# Patient Record
Sex: FEMALE | Race: WHITE | NOT HISPANIC OR LATINO | Employment: STUDENT | URBAN - METROPOLITAN AREA
[De-identification: names, ages, dates, MRNs, and addresses within clinical notes are randomized per-mention and may not be internally consistent; named-entity substitution may affect disease eponyms.]

---

## 2021-10-04 ENCOUNTER — APPOINTMENT (OUTPATIENT)
Dept: RADIOLOGY | Facility: MEDICAL CENTER | Age: 51
End: 2021-10-04
Attending: EMERGENCY MEDICINE
Payer: MEDICAID

## 2021-10-04 ENCOUNTER — HOSPITAL ENCOUNTER (EMERGENCY)
Facility: MEDICAL CENTER | Age: 51
End: 2021-10-04
Attending: EMERGENCY MEDICINE
Payer: MEDICAID

## 2021-10-04 VITALS
OXYGEN SATURATION: 96 % | BODY MASS INDEX: 36.88 KG/M2 | TEMPERATURE: 97.6 F | HEART RATE: 65 BPM | SYSTOLIC BLOOD PRESSURE: 164 MMHG | HEIGHT: 72 IN | DIASTOLIC BLOOD PRESSURE: 96 MMHG | RESPIRATION RATE: 14 BRPM | WEIGHT: 272.27 LBS

## 2021-10-04 DIAGNOSIS — R10.13 EPIGASTRIC PAIN: ICD-10-CM

## 2021-10-04 LAB
ALBUMIN SERPL BCP-MCNC: 4.9 G/DL (ref 3.2–4.9)
ALBUMIN/GLOB SERPL: 1.6 G/DL
ALP SERPL-CCNC: 88 U/L (ref 30–99)
ALT SERPL-CCNC: 35 U/L (ref 2–50)
ANION GAP SERPL CALC-SCNC: 10 MMOL/L (ref 7–16)
APPEARANCE UR: CLEAR
AST SERPL-CCNC: 27 U/L (ref 12–45)
BASOPHILS # BLD AUTO: 0.4 % (ref 0–1.8)
BASOPHILS # BLD: 0.04 K/UL (ref 0–0.12)
BILIRUB SERPL-MCNC: 0.4 MG/DL (ref 0.1–1.5)
BILIRUB UR QL STRIP.AUTO: NEGATIVE
BUN SERPL-MCNC: 8 MG/DL (ref 8–22)
CALCIUM SERPL-MCNC: 9.7 MG/DL (ref 8.5–10.5)
CHLORIDE SERPL-SCNC: 103 MMOL/L (ref 96–112)
CO2 SERPL-SCNC: 26 MMOL/L (ref 20–33)
COLOR UR: YELLOW
CREAT SERPL-MCNC: 0.76 MG/DL (ref 0.5–1.4)
EOSINOPHIL # BLD AUTO: 0.02 K/UL (ref 0–0.51)
EOSINOPHIL NFR BLD: 0.2 % (ref 0–6.9)
ERYTHROCYTE [DISTWIDTH] IN BLOOD BY AUTOMATED COUNT: 44 FL (ref 35.9–50)
GLOBULIN SER CALC-MCNC: 3.1 G/DL (ref 1.9–3.5)
GLUCOSE SERPL-MCNC: 134 MG/DL (ref 65–99)
GLUCOSE UR STRIP.AUTO-MCNC: NEGATIVE MG/DL
HCT VFR BLD AUTO: 42.7 % (ref 37–47)
HGB BLD-MCNC: 14.3 G/DL (ref 12–16)
IMM GRANULOCYTES # BLD AUTO: 0.03 K/UL (ref 0–0.11)
IMM GRANULOCYTES NFR BLD AUTO: 0.3 % (ref 0–0.9)
KETONES UR STRIP.AUTO-MCNC: NEGATIVE MG/DL
LEUKOCYTE ESTERASE UR QL STRIP.AUTO: NEGATIVE
LIPASE SERPL-CCNC: 23 U/L (ref 11–82)
LYMPHOCYTES # BLD AUTO: 1.05 K/UL (ref 1–4.8)
LYMPHOCYTES NFR BLD: 10.7 % (ref 22–41)
MCH RBC QN AUTO: 32.1 PG (ref 27–33)
MCHC RBC AUTO-ENTMCNC: 33.5 G/DL (ref 33.6–35)
MCV RBC AUTO: 95.7 FL (ref 81.4–97.8)
MICRO URNS: NORMAL
MONOCYTES # BLD AUTO: 0.32 K/UL (ref 0–0.85)
MONOCYTES NFR BLD AUTO: 3.2 % (ref 0–13.4)
NEUTROPHILS # BLD AUTO: 8.39 K/UL (ref 2–7.15)
NEUTROPHILS NFR BLD: 85.2 % (ref 44–72)
NITRITE UR QL STRIP.AUTO: NEGATIVE
NRBC # BLD AUTO: 0 K/UL
NRBC BLD-RTO: 0 /100 WBC
PH UR STRIP.AUTO: 7.5 [PH] (ref 5–8)
PLATELET # BLD AUTO: 373 K/UL (ref 164–446)
PMV BLD AUTO: 9 FL (ref 9–12.9)
POTASSIUM SERPL-SCNC: 4.5 MMOL/L (ref 3.6–5.5)
PROT SERPL-MCNC: 8 G/DL (ref 6–8.2)
PROT UR QL STRIP: NEGATIVE MG/DL
RBC # BLD AUTO: 4.46 M/UL (ref 4.2–5.4)
RBC UR QL AUTO: NEGATIVE
SODIUM SERPL-SCNC: 139 MMOL/L (ref 135–145)
SP GR UR STRIP.AUTO: 1.02
UROBILINOGEN UR STRIP.AUTO-MCNC: 0.2 MG/DL
WBC # BLD AUTO: 9.9 K/UL (ref 4.8–10.8)

## 2021-10-04 PROCEDURE — 85025 COMPLETE CBC W/AUTO DIFF WBC: CPT

## 2021-10-04 PROCEDURE — 700105 HCHG RX REV CODE 258: Performed by: EMERGENCY MEDICINE

## 2021-10-04 PROCEDURE — 83690 ASSAY OF LIPASE: CPT

## 2021-10-04 PROCEDURE — 80053 COMPREHEN METABOLIC PANEL: CPT

## 2021-10-04 PROCEDURE — A9270 NON-COVERED ITEM OR SERVICE: HCPCS | Performed by: EMERGENCY MEDICINE

## 2021-10-04 PROCEDURE — 74177 CT ABD & PELVIS W/CONTRAST: CPT

## 2021-10-04 PROCEDURE — 99285 EMERGENCY DEPT VISIT HI MDM: CPT

## 2021-10-04 PROCEDURE — 36415 COLL VENOUS BLD VENIPUNCTURE: CPT

## 2021-10-04 PROCEDURE — 700117 HCHG RX CONTRAST REV CODE 255: Performed by: EMERGENCY MEDICINE

## 2021-10-04 PROCEDURE — 81003 URINALYSIS AUTO W/O SCOPE: CPT

## 2021-10-04 PROCEDURE — 96374 THER/PROPH/DIAG INJ IV PUSH: CPT | Mod: XU

## 2021-10-04 PROCEDURE — 76705 ECHO EXAM OF ABDOMEN: CPT

## 2021-10-04 PROCEDURE — 700111 HCHG RX REV CODE 636 W/ 250 OVERRIDE (IP): Performed by: EMERGENCY MEDICINE

## 2021-10-04 PROCEDURE — 700102 HCHG RX REV CODE 250 W/ 637 OVERRIDE(OP): Performed by: EMERGENCY MEDICINE

## 2021-10-04 RX ORDER — ONDANSETRON 2 MG/ML
4 INJECTION INTRAMUSCULAR; INTRAVENOUS ONCE
Status: COMPLETED | OUTPATIENT
Start: 2021-10-04 | End: 2021-10-04

## 2021-10-04 RX ORDER — SODIUM CHLORIDE 9 MG/ML
1000 INJECTION, SOLUTION INTRAVENOUS ONCE
Status: COMPLETED | OUTPATIENT
Start: 2021-10-04 | End: 2021-10-04

## 2021-10-04 RX ADMIN — LIDOCAINE HYDROCHLORIDE 30 ML: 20 SOLUTION OROPHARYNGEAL at 11:06

## 2021-10-04 RX ADMIN — IOHEXOL 100 ML: 350 INJECTION, SOLUTION INTRAVENOUS at 12:00

## 2021-10-04 RX ADMIN — ONDANSETRON 4 MG: 2 INJECTION INTRAMUSCULAR; INTRAVENOUS at 08:20

## 2021-10-04 RX ADMIN — SODIUM CHLORIDE 1000 ML: 9 INJECTION, SOLUTION INTRAVENOUS at 08:20

## 2021-10-04 ASSESSMENT — ENCOUNTER SYMPTOMS
NAUSEA: 1
ABDOMINAL PAIN: 1
VOMITING: 1
DIARRHEA: 0
COUGH: 0
FEVER: 0
BLOOD IN STOOL: 0

## 2021-10-04 ASSESSMENT — LIFESTYLE VARIABLES: DO YOU DRINK ALCOHOL: NO

## 2021-10-04 NOTE — ED NOTES
Pt aware of plan of care.    Points to epigastric in relation to pain, radiates into back.     Urine sent.  PIV initiated, blood drawn and sent to lab.

## 2021-10-04 NOTE — ED TRIAGE NOTES
"Chief Complaint   Patient presents with   • Abdominal Pain     PT reports abd pain for the last 4 hours and reports vomited x 1 and still feeling terrible.     Blood Pressure (Abnormal) 163/89   Pulse (Abnormal) 58   Temperature 35.9 °C (96.6 °F) (Temporal)   Respiration 18   Height 1.854 m (6' 1\")   Weight 124 kg (272 lb 4.3 oz)   Oxygen Saturation 96% Comment: Room air  Body Mass Index 35.92 kg/m²     "

## 2021-10-04 NOTE — DISCHARGE INSTRUCTIONS
Return to the emergency department if your abdominal pain returns, if you have fever, vomiting, bloody or black stool or other concerns.  This may be coming from your gallbladder.  Follow-up with Dr. Ordoñez.  Continue your home antacid medications.  Rest, drink plenty of fluids and avoid fatty foods.

## 2021-10-04 NOTE — ED PROVIDER NOTES
ED Provider Note    Scribed for Jann Rosario M.D. by Juan Alberto Holder. 10/4/2021, 8:05 AM.    Primary care provider: Pcp Unobtainable By   Means of arrival: Walk-in  History obtained from: Patient  History limited by: None    CHIEF COMPLAINT  Chief Complaint   Patient presents with   • Abdominal Pain     PT reports abd pain for the last 4 hours and reports vomited x 1 and still feeling terrible.       SAMIR Eastman is a 51 y.o. female who presents to the Emergency Department for acute abdominal pain. Patient states her pain started around 10-11pm last night. The pain radiates to her back on both sides and is constant. There are no known alleviating or exacerbating factors. She has associated nausea and vomiting, denies any hematemesis, melena, dysuria, fevers, rhinorrhea, cough, or diarrhea. Patient reports having similar episodes of pain in the past which were treated with antiemetics. She also has a history of ulcers. She denies any abdominal surgeries.     REVIEW OF SYSTEMS  Review of Systems   Constitutional: Negative for fever.   Respiratory: Negative for cough.    Gastrointestinal: Positive for abdominal pain, nausea and vomiting. Negative for blood in stool, diarrhea and melena.   Genitourinary: Negative for dysuria.   All other systems reviewed and are negative.      PAST MEDICAL HISTORY     History of peptic ulcer disease.      SURGICAL HISTORY  patient denies any surgical history    SOCIAL HISTORY  Social History     Tobacco Use   • Smoking status: Not on file   Substance Use Topics   • Alcohol use: Not on file   • Drug use: Not on file      Social History     Substance and Sexual Activity   Drug Use Not on file       FAMILY HISTORY  No family history on file.    CURRENT MEDICATIONS  Home Medications    **Home medications have not yet been reviewed for this encounter**         ALLERGIES  Allergies   Allergen Reactions   • Compazine Hives     Hives         PHYSICAL EXAM  VITAL SIGNS: /91   " Pulse 76   Temp 36.5 °C (97.7 °F) (Temporal)   Resp 18   Ht 1.854 m (6' 1\")   Wt 124 kg (272 lb 4.3 oz)   SpO2 94%   BMI 35.92 kg/m²   Vitals reviewed.  Constitutional: Well developed, Well nourished, No acute distress, Non-toxic appearance.   HENT: Normocephalic, Atraumatic, Bilateral external ears normal,  Nose normal.   Eyes: PERRL, EOMI, Conjunctiva normal, No discharge.   Neck: Normal range of motion, No tenderness, Supple, No stridor.   Cardiovascular: Normal heart rate, Normal rhythm, No murmurs, No rubs, No gallops.   Thorax & Lungs: Normal breath sounds, No respiratory distress, No wheezing, No chest tenderness.   Abdomen: RUQ and epigastric tenderness to palpation, positive Coelho sign bowel sounds normal, Soft.  Skin: Warm, Dry, No erythema, No rash.   Back: No tenderness, No CVA tenderness.   Musculoskeletal: Good range of motion in all major joints.  Neurologic: Alert, No focal deficits noted.   Psychiatric: Affect normal    LABS  Results for orders placed or performed during the hospital encounter of 10/04/21   CBC WITH DIFFERENTIAL   Result Value Ref Range    WBC 9.9 4.8 - 10.8 K/uL    RBC 4.46 4.20 - 5.40 M/uL    Hemoglobin 14.3 12.0 - 16.0 g/dL    Hematocrit 42.7 37.0 - 47.0 %    MCV 95.7 81.4 - 97.8 fL    MCH 32.1 27.0 - 33.0 pg    MCHC 33.5 (L) 33.6 - 35.0 g/dL    RDW 44.0 35.9 - 50.0 fL    Platelet Count 373 164 - 446 K/uL    MPV 9.0 9.0 - 12.9 fL    Neutrophils-Polys 85.20 (H) 44.00 - 72.00 %    Lymphocytes 10.70 (L) 22.00 - 41.00 %    Monocytes 3.20 0.00 - 13.40 %    Eosinophils 0.20 0.00 - 6.90 %    Basophils 0.40 0.00 - 1.80 %    Immature Granulocytes 0.30 0.00 - 0.90 %    Nucleated RBC 0.00 /100 WBC    Neutrophils (Absolute) 8.39 (H) 2.00 - 7.15 K/uL    Lymphs (Absolute) 1.05 1.00 - 4.80 K/uL    Monos (Absolute) 0.32 0.00 - 0.85 K/uL    Eos (Absolute) 0.02 0.00 - 0.51 K/uL    Baso (Absolute) 0.04 0.00 - 0.12 K/uL    Immature Granulocytes (abs) 0.03 0.00 - 0.11 K/uL    NRBC (Absolute) " 0.00 K/uL   COMP METABOLIC PANEL   Result Value Ref Range    Sodium 139 135 - 145 mmol/L    Potassium 4.5 3.6 - 5.5 mmol/L    Chloride 103 96 - 112 mmol/L    Co2 26 20 - 33 mmol/L    Anion Gap 10.0 7.0 - 16.0    Glucose 134 (H) 65 - 99 mg/dL    Bun 8 8 - 22 mg/dL    Creatinine 0.76 0.50 - 1.40 mg/dL    Calcium 9.7 8.5 - 10.5 mg/dL    AST(SGOT) 27 12 - 45 U/L    ALT(SGPT) 35 2 - 50 U/L    Alkaline Phosphatase 88 30 - 99 U/L    Total Bilirubin 0.4 0.1 - 1.5 mg/dL    Albumin 4.9 3.2 - 4.9 g/dL    Total Protein 8.0 6.0 - 8.2 g/dL    Globulin 3.1 1.9 - 3.5 g/dL    A-G Ratio 1.6 g/dL   LIPASE   Result Value Ref Range    Lipase 23 11 - 82 U/L   URINALYSIS    Specimen: Urine   Result Value Ref Range    Color Yellow     Character Clear     Specific Gravity 1.021 <1.035    Ph 7.5 5.0 - 8.0    Glucose Negative Negative mg/dL    Ketones Negative Negative mg/dL    Protein Negative Negative mg/dL    Bilirubin Negative Negative    Urobilinogen, Urine 0.2 Negative    Nitrite Negative Negative    Leukocyte Esterase Negative Negative    Occult Blood Negative Negative    Micro Urine Req see below    ESTIMATED GFR   Result Value Ref Range    GFR If African American >60 >60 mL/min/1.73 m 2    GFR If Non African American >60 >60 mL/min/1.73 m 2     All labs reviewed by me.    RADIOLOGY  CT-ABDOMEN-PELVIS WITH   Final Result      1.  Findings concerning for acute cholecystitis.   2.  No bowel obstruction or perforation.   3.  Normal appendix.   4.  Probable uterine fibroid.      US-RUQ   Final Result      1.  Fatty infiltration appearance of the liver. Mild hepatomegaly.      2.  No cholelithiasis or biliary dilatation.      3.  Enlarged heterogeneous pancreas which is nonspecific. This could represent sequela of prior pancreatitis. No peripancreatic fluid is identified to suggest acute pancreatitis.      4.  No right hydronephrosis or free fluid.        The radiologist's interpretation of all radiological studies have been reviewed by  me.    COURSE & MEDICAL DECISION MAKING  Pertinent Labs & Imaging studies reviewed. (See chart for details)    Obtained and reviewed past medical records from 10/4/21 which indicated no pertinent records to review. .    8:05 AM Patient seen and examined at bedside. The patient presents with abdominal pain, and the differential diagnosis includes but is not limited to pancreatits ,peptic ulcer disease, symptomatic cholelithiasis, cholecystitis, gastroenteritis. Ordered for US-RUQ, CBC w/ diff, CMP, Lipase, and UA to evaluate. Patient will be treated with Zofran 4 mg for her symptoms.      10:55 AM Updated patient on results; she continues to have abdominal pain. Patient treated with GI Cocktail 30 mL. CT-abdomen-pelvis w/ ordered.    12:00 PM General Surgery paged.     12:05 PM I discussed the patient's case and the above findings with Dr. Ordoñez (Surgery) who reviewed the imaging; states patient can decide whether they be hospitalized or discharged with strict return precautions and outpatient follow up.  He does not feel that it is emergent require hospitalization ultrasound abnormality or lab abnormality.  He feels the patient can be discharged home and follow-up.  He would like me to auscultation.      Discussed with patient.  Exam is benign without any tenderness or peritonitis.  She still has a little bit discomfort but now she has a negative Coelho sign.  She does not want to be admitted.  She is on a PPI.  I think is unlikely since peptic ulcer disease, having black stool her labs are reassuring.    CT does not show any acute inflammatory change other than a questionable cholecystitis, however, we do not see this on ultrasound.    Abnormal labs reassuring repeat exam is reasonable to send her home.  She will return if she is more pain fever or vomiting.  She is referred to the surgeon as well as her doctor.  She understands she does have a low threshold for returning.  Her questions are answered, she is  agreeable to plan, she is discharged good condition.    12:14 PM Updated patient on results and plan of care. Discussed discharge instructions and return precautions with the patient and they were cleared for discharge. Patient was given the opportunity to ask any further questions. Patient is comfortable with discharge at this time.       HYDRATION: Based on the patient's presentation of Acute Vomiting the patient was given IV fluids. IV Hydration was used because oral hydration was not adequate alone. Upon recheck following hydration, the patient was improved.        The patient will return for new or worsening symptoms and is stable at the time of discharge.    The patient is referred to a primary physician for blood pressure management, diabetic screening, and for all other preventative health concerns.    DISPOSITION:  Patient will be discharged home in stable condition.    FOLLOW UP:  JOVAN Trotter  [address] [facility] [address]  [phone]          Jason Ordoñez M.D.  [address] [facility] [id number] [phone]    Schedule an appointment as soon as possible for a visit in 2 days        OUTPATIENT MEDICATIONS:  There are no discharge medications for this patient.      FINAL IMPRESSION  1. Epigastric pain          Juan Alberto DIXON (Scribe), am scribing for, and in the presence of, Jann Rosario M.D..    Electronically signed by: Juan Alberto Holder (Scribe), 10/4/2021    Jann DIXON M.D. personally performed the services described in this documentation, as scribed by Juan Alberto Holder in my presence, and it is both accurate and complete.    The note accurately reflects work and decisions made by me.  Jann Rosario M.D.  10/4/2021  2:09 PM

## 2021-12-09 ENCOUNTER — APPOINTMENT (OUTPATIENT)
Dept: RADIOLOGY | Facility: MEDICAL CENTER | Age: 51
DRG: 416 | End: 2021-12-09
Attending: EMERGENCY MEDICINE
Payer: MEDICAID

## 2021-12-09 ENCOUNTER — HOSPITAL ENCOUNTER (INPATIENT)
Facility: MEDICAL CENTER | Age: 51
LOS: 6 days | DRG: 416 | End: 2021-12-15
Attending: EMERGENCY MEDICINE | Admitting: SURGERY
Payer: MEDICAID

## 2021-12-09 DIAGNOSIS — K81.0 ACUTE CHOLECYSTITIS: ICD-10-CM

## 2021-12-09 LAB
ALBUMIN SERPL BCP-MCNC: 4.3 G/DL (ref 3.2–4.9)
ALBUMIN/GLOB SERPL: 1.3 G/DL
ALP SERPL-CCNC: 146 U/L (ref 30–99)
ALT SERPL-CCNC: 38 U/L (ref 2–50)
ANION GAP SERPL CALC-SCNC: 12 MMOL/L (ref 7–16)
APPEARANCE UR: CLEAR
AST SERPL-CCNC: 25 U/L (ref 12–45)
BASOPHILS # BLD AUTO: 0.8 % (ref 0–1.8)
BASOPHILS # BLD: 0.07 K/UL (ref 0–0.12)
BILIRUB SERPL-MCNC: 0.2 MG/DL (ref 0.1–1.5)
BILIRUB UR QL STRIP.AUTO: NEGATIVE
BUN SERPL-MCNC: 10 MG/DL (ref 8–22)
CALCIUM SERPL-MCNC: 9.3 MG/DL (ref 8.5–10.5)
CHLORIDE SERPL-SCNC: 104 MMOL/L (ref 96–112)
CO2 SERPL-SCNC: 22 MMOL/L (ref 20–33)
COLOR UR: YELLOW
CREAT SERPL-MCNC: 0.63 MG/DL (ref 0.5–1.4)
EOSINOPHIL # BLD AUTO: 0.19 K/UL (ref 0–0.51)
EOSINOPHIL NFR BLD: 2.2 % (ref 0–6.9)
ERYTHROCYTE [DISTWIDTH] IN BLOOD BY AUTOMATED COUNT: 43.1 FL (ref 35.9–50)
GLOBULIN SER CALC-MCNC: 3.3 G/DL (ref 1.9–3.5)
GLUCOSE SERPL-MCNC: 204 MG/DL (ref 65–99)
GLUCOSE UR STRIP.AUTO-MCNC: NEGATIVE MG/DL
HCG SERPL QL: NEGATIVE
HCT VFR BLD AUTO: 41.1 % (ref 37–47)
HGB BLD-MCNC: 13.7 G/DL (ref 12–16)
IMM GRANULOCYTES # BLD AUTO: 0.02 K/UL (ref 0–0.11)
IMM GRANULOCYTES NFR BLD AUTO: 0.2 % (ref 0–0.9)
KETONES UR STRIP.AUTO-MCNC: NEGATIVE MG/DL
LEUKOCYTE ESTERASE UR QL STRIP.AUTO: NEGATIVE
LIPASE SERPL-CCNC: 33 U/L (ref 11–82)
LYMPHOCYTES # BLD AUTO: 2.76 K/UL (ref 1–4.8)
LYMPHOCYTES NFR BLD: 32.3 % (ref 22–41)
MCH RBC QN AUTO: 30.8 PG (ref 27–33)
MCHC RBC AUTO-ENTMCNC: 33.3 G/DL (ref 33.6–35)
MCV RBC AUTO: 92.4 FL (ref 81.4–97.8)
MICRO URNS: NORMAL
MONOCYTES # BLD AUTO: 0.48 K/UL (ref 0–0.85)
MONOCYTES NFR BLD AUTO: 5.6 % (ref 0–13.4)
NEUTROPHILS # BLD AUTO: 5.03 K/UL (ref 2–7.15)
NEUTROPHILS NFR BLD: 58.9 % (ref 44–72)
NITRITE UR QL STRIP.AUTO: NEGATIVE
NRBC # BLD AUTO: 0 K/UL
NRBC BLD-RTO: 0 /100 WBC
PH UR STRIP.AUTO: 5 [PH] (ref 5–8)
PLATELET # BLD AUTO: 396 K/UL (ref 164–446)
PMV BLD AUTO: 9 FL (ref 9–12.9)
POTASSIUM SERPL-SCNC: 4 MMOL/L (ref 3.6–5.5)
PROT SERPL-MCNC: 7.6 G/DL (ref 6–8.2)
PROT UR QL STRIP: NEGATIVE MG/DL
RBC # BLD AUTO: 4.45 M/UL (ref 4.2–5.4)
RBC UR QL AUTO: NEGATIVE
SARS-COV+SARS-COV-2 AG RESP QL IA.RAPID: NOTDETECTED
SODIUM SERPL-SCNC: 138 MMOL/L (ref 135–145)
SP GR UR STRIP.AUTO: 1.01
SPECIMEN SOURCE: NORMAL
UROBILINOGEN UR STRIP.AUTO-MCNC: 0.2 MG/DL
WBC # BLD AUTO: 8.6 K/UL (ref 4.8–10.8)

## 2021-12-09 PROCEDURE — 85025 COMPLETE CBC W/AUTO DIFF WBC: CPT

## 2021-12-09 PROCEDURE — 700105 HCHG RX REV CODE 258: Performed by: EMERGENCY MEDICINE

## 2021-12-09 PROCEDURE — 87426 SARSCOV CORONAVIRUS AG IA: CPT

## 2021-12-09 PROCEDURE — 76705 ECHO EXAM OF ABDOMEN: CPT

## 2021-12-09 PROCEDURE — 84703 CHORIONIC GONADOTROPIN ASSAY: CPT

## 2021-12-09 PROCEDURE — 96365 THER/PROPH/DIAG IV INF INIT: CPT

## 2021-12-09 PROCEDURE — 700111 HCHG RX REV CODE 636 W/ 250 OVERRIDE (IP): Performed by: EMERGENCY MEDICINE

## 2021-12-09 PROCEDURE — 700105 HCHG RX REV CODE 258: Performed by: SURGERY

## 2021-12-09 PROCEDURE — 770001 HCHG ROOM/CARE - MED/SURG/GYN PRIV*

## 2021-12-09 PROCEDURE — 83690 ASSAY OF LIPASE: CPT

## 2021-12-09 PROCEDURE — 81003 URINALYSIS AUTO W/O SCOPE: CPT

## 2021-12-09 PROCEDURE — 99222 1ST HOSP IP/OBS MODERATE 55: CPT | Mod: 57 | Performed by: SURGERY

## 2021-12-09 PROCEDURE — 80053 COMPREHEN METABOLIC PANEL: CPT

## 2021-12-09 PROCEDURE — 99291 CRITICAL CARE FIRST HOUR: CPT

## 2021-12-09 RX ORDER — TRAZODONE HYDROCHLORIDE 100 MG/1
100 TABLET ORAL NIGHTLY
COMMUNITY

## 2021-12-09 RX ORDER — SODIUM CHLORIDE, SODIUM LACTATE, POTASSIUM CHLORIDE, CALCIUM CHLORIDE 600; 310; 30; 20 MG/100ML; MG/100ML; MG/100ML; MG/100ML
INJECTION, SOLUTION INTRAVENOUS CONTINUOUS
Status: DISCONTINUED | OUTPATIENT
Start: 2021-12-09 | End: 2021-12-11

## 2021-12-09 RX ORDER — ATORVASTATIN CALCIUM 20 MG/1
20 TABLET, FILM COATED ORAL DAILY
COMMUNITY

## 2021-12-09 RX ORDER — PANTOPRAZOLE SODIUM 40 MG/1
40 TABLET, DELAYED RELEASE ORAL DAILY
COMMUNITY

## 2021-12-09 RX ORDER — ARIPIPRAZOLE 5 MG/1
5 TABLET ORAL DAILY
COMMUNITY

## 2021-12-09 RX ORDER — LISINOPRIL 10 MG/1
10 TABLET ORAL DAILY
COMMUNITY

## 2021-12-09 RX ORDER — SERTRALINE HYDROCHLORIDE 100 MG/1
100 TABLET, FILM COATED ORAL DAILY
COMMUNITY

## 2021-12-09 RX ADMIN — PIPERACILLIN AND TAZOBACTAM 3.38 G: 3; .375 INJECTION, POWDER, LYOPHILIZED, FOR SOLUTION INTRAVENOUS; PARENTERAL at 20:21

## 2021-12-09 RX ADMIN — SODIUM CHLORIDE, POTASSIUM CHLORIDE, SODIUM LACTATE AND CALCIUM CHLORIDE: 600; 310; 30; 20 INJECTION, SOLUTION INTRAVENOUS at 20:22

## 2021-12-09 ASSESSMENT — FIBROSIS 4 INDEX: FIB4 SCORE: 0.65

## 2021-12-10 ENCOUNTER — ANESTHESIA EVENT (OUTPATIENT)
Dept: SURGERY | Facility: MEDICAL CENTER | Age: 51
DRG: 416 | End: 2021-12-10
Payer: MEDICAID

## 2021-12-10 ENCOUNTER — ANESTHESIA (OUTPATIENT)
Dept: SURGERY | Facility: MEDICAL CENTER | Age: 51
DRG: 416 | End: 2021-12-10
Payer: MEDICAID

## 2021-12-10 PROBLEM — E66.9 OBESITY (BMI 30.0-34.9): Status: ACTIVE | Noted: 2021-12-10

## 2021-12-10 LAB
GLUCOSE BLD-MCNC: 143 MG/DL (ref 65–99)
PATHOLOGY CONSULT NOTE: NORMAL

## 2021-12-10 PROCEDURE — 700102 HCHG RX REV CODE 250 W/ 637 OVERRIDE(OP): Performed by: SURGERY

## 2021-12-10 PROCEDURE — 501583 HCHG TROCAR, THRD CAN&SEAL 5X100: Performed by: SURGERY

## 2021-12-10 PROCEDURE — 700111 HCHG RX REV CODE 636 W/ 250 OVERRIDE (IP): Performed by: SURGERY

## 2021-12-10 PROCEDURE — 160009 HCHG ANES TIME/MIN: Performed by: SURGERY

## 2021-12-10 PROCEDURE — 160036 HCHG PACU - EA ADDL 30 MINS PHASE I: Performed by: SURGERY

## 2021-12-10 PROCEDURE — 501399 HCHG SPECIMAN BAG, ENDO CATC: Performed by: SURGERY

## 2021-12-10 PROCEDURE — 700102 HCHG RX REV CODE 250 W/ 637 OVERRIDE(OP): Performed by: ANESTHESIOLOGY

## 2021-12-10 PROCEDURE — A6402 STERILE GAUZE <= 16 SQ IN: HCPCS | Performed by: SURGERY

## 2021-12-10 PROCEDURE — 47600 CHOLECYSTECTOMY: CPT | Mod: 22 | Performed by: SURGERY

## 2021-12-10 PROCEDURE — 501572 HCHG TROCAR, SHIELD OBTU 5X100: Performed by: SURGERY

## 2021-12-10 PROCEDURE — 700101 HCHG RX REV CODE 250: Performed by: ANESTHESIOLOGY

## 2021-12-10 PROCEDURE — 770001 HCHG ROOM/CARE - MED/SURG/GYN PRIV*

## 2021-12-10 PROCEDURE — 64488 TAP BLOCK BI INJECTION: CPT | Performed by: SURGERY

## 2021-12-10 PROCEDURE — 160041 HCHG SURGERY MINUTES - EA ADDL 1 MIN LEVEL 4: Performed by: SURGERY

## 2021-12-10 PROCEDURE — 700101 HCHG RX REV CODE 250: Performed by: SURGERY

## 2021-12-10 PROCEDURE — 88304 TISSUE EXAM BY PATHOLOGIST: CPT | Mod: 59

## 2021-12-10 PROCEDURE — A9270 NON-COVERED ITEM OR SERVICE: HCPCS | Performed by: ANESTHESIOLOGY

## 2021-12-10 PROCEDURE — 502703 HCHG DEVICE, LIGASURE V SEALER: Performed by: SURGERY

## 2021-12-10 PROCEDURE — 700105 HCHG RX REV CODE 258: Performed by: SURGERY

## 2021-12-10 PROCEDURE — 160035 HCHG PACU - 1ST 60 MINS PHASE I: Performed by: SURGERY

## 2021-12-10 PROCEDURE — 700111 HCHG RX REV CODE 636 W/ 250 OVERRIDE (IP): Performed by: ANESTHESIOLOGY

## 2021-12-10 PROCEDURE — 82962 GLUCOSE BLOOD TEST: CPT

## 2021-12-10 PROCEDURE — 502571 HCHG PACK, LAP CHOLE: Performed by: SURGERY

## 2021-12-10 PROCEDURE — 700102 HCHG RX REV CODE 250 W/ 637 OVERRIDE(OP)

## 2021-12-10 PROCEDURE — 160002 HCHG RECOVERY MINUTES (STAT): Performed by: SURGERY

## 2021-12-10 PROCEDURE — 501582 HCHG TROCAR, THRD BLADED: Performed by: SURGERY

## 2021-12-10 PROCEDURE — 88331 PATH CONSLTJ SURG 1 BLK 1SPC: CPT

## 2021-12-10 PROCEDURE — 160029 HCHG SURGERY MINUTES - 1ST 30 MINS LEVEL 4: Performed by: SURGERY

## 2021-12-10 PROCEDURE — A9270 NON-COVERED ITEM OR SERVICE: HCPCS | Performed by: SURGERY

## 2021-12-10 PROCEDURE — A9270 NON-COVERED ITEM OR SERVICE: HCPCS

## 2021-12-10 PROCEDURE — 502240 HCHG MISC OR SUPPLY RC 0272: Performed by: SURGERY

## 2021-12-10 PROCEDURE — 160048 HCHG OR STATISTICAL LEVEL 1-5: Performed by: SURGERY

## 2021-12-10 PROCEDURE — 500373 HCHG DRAIN, J-P FLAT 10MM 7044: Performed by: SURGERY

## 2021-12-10 PROCEDURE — 501838 HCHG SUTURE GENERAL: Performed by: SURGERY

## 2021-12-10 PROCEDURE — 96366 THER/PROPH/DIAG IV INF ADDON: CPT

## 2021-12-10 RX ORDER — ATORVASTATIN CALCIUM 20 MG/1
20 TABLET, FILM COATED ORAL DAILY
Status: DISCONTINUED | OUTPATIENT
Start: 2021-12-10 | End: 2021-12-15 | Stop reason: HOSPADM

## 2021-12-10 RX ORDER — HALOPERIDOL 5 MG/ML
1 INJECTION INTRAMUSCULAR
Status: DISCONTINUED | OUTPATIENT
Start: 2021-12-10 | End: 2021-12-10 | Stop reason: HOSPADM

## 2021-12-10 RX ORDER — DEXAMETHASONE SODIUM PHOSPHATE 4 MG/ML
INJECTION, SOLUTION INTRA-ARTICULAR; INTRALESIONAL; INTRAMUSCULAR; INTRAVENOUS; SOFT TISSUE PRN
Status: DISCONTINUED | OUTPATIENT
Start: 2021-12-10 | End: 2021-12-10 | Stop reason: SURG

## 2021-12-10 RX ORDER — DOCUSATE SODIUM 100 MG/1
100 CAPSULE, LIQUID FILLED ORAL 2 TIMES DAILY
Status: DISCONTINUED | OUTPATIENT
Start: 2021-12-10 | End: 2021-12-15 | Stop reason: HOSPADM

## 2021-12-10 RX ORDER — LISINOPRIL 10 MG/1
10 TABLET ORAL DAILY
Status: DISCONTINUED | OUTPATIENT
Start: 2021-12-10 | End: 2021-12-15 | Stop reason: HOSPADM

## 2021-12-10 RX ORDER — AMOXICILLIN 250 MG
1 CAPSULE ORAL NIGHTLY
Status: DISCONTINUED | OUTPATIENT
Start: 2021-12-10 | End: 2021-12-15 | Stop reason: HOSPADM

## 2021-12-10 RX ORDER — ACETAMINOPHEN 325 MG/1
650 TABLET ORAL EVERY 6 HOURS PRN
Status: DISCONTINUED | OUTPATIENT
Start: 2021-12-15 | End: 2021-12-10

## 2021-12-10 RX ORDER — ONDANSETRON 2 MG/ML
4 INJECTION INTRAMUSCULAR; INTRAVENOUS
Status: COMPLETED | OUTPATIENT
Start: 2021-12-10 | End: 2021-12-10

## 2021-12-10 RX ORDER — LABETALOL HYDROCHLORIDE 5 MG/ML
5 INJECTION, SOLUTION INTRAVENOUS
Status: DISCONTINUED | OUTPATIENT
Start: 2021-12-10 | End: 2021-12-10 | Stop reason: HOSPADM

## 2021-12-10 RX ORDER — HYDROCODONE BITARTRATE AND ACETAMINOPHEN 5; 325 MG/1; MG/1
1-2 TABLET ORAL EVERY 4 HOURS PRN
Status: DISCONTINUED | OUTPATIENT
Start: 2021-12-10 | End: 2021-12-15 | Stop reason: HOSPADM

## 2021-12-10 RX ORDER — MEPERIDINE HYDROCHLORIDE 25 MG/ML
12.5 INJECTION INTRAMUSCULAR; INTRAVENOUS; SUBCUTANEOUS
Status: DISCONTINUED | OUTPATIENT
Start: 2021-12-10 | End: 2021-12-10 | Stop reason: HOSPADM

## 2021-12-10 RX ORDER — ROCURONIUM BROMIDE 10 MG/ML
INJECTION, SOLUTION INTRAVENOUS PRN
Status: DISCONTINUED | OUTPATIENT
Start: 2021-12-10 | End: 2021-12-10 | Stop reason: SURG

## 2021-12-10 RX ORDER — ARIPIPRAZOLE 5 MG/1
5 TABLET ORAL DAILY
Status: DISCONTINUED | OUTPATIENT
Start: 2021-12-10 | End: 2021-12-15 | Stop reason: HOSPADM

## 2021-12-10 RX ORDER — ACETAMINOPHEN 325 MG/1
650 TABLET ORAL EVERY 6 HOURS
Status: DISCONTINUED | OUTPATIENT
Start: 2021-12-10 | End: 2021-12-10

## 2021-12-10 RX ORDER — HYDROMORPHONE HYDROCHLORIDE 1 MG/ML
0.2 INJECTION, SOLUTION INTRAMUSCULAR; INTRAVENOUS; SUBCUTANEOUS
Status: DISCONTINUED | OUTPATIENT
Start: 2021-12-10 | End: 2021-12-10 | Stop reason: HOSPADM

## 2021-12-10 RX ORDER — ONDANSETRON 4 MG/1
4 TABLET, ORALLY DISINTEGRATING ORAL EVERY 4 HOURS PRN
Status: DISCONTINUED | OUTPATIENT
Start: 2021-12-10 | End: 2021-12-15 | Stop reason: HOSPADM

## 2021-12-10 RX ORDER — ONDANSETRON 2 MG/ML
4 INJECTION INTRAMUSCULAR; INTRAVENOUS EVERY 4 HOURS PRN
Status: DISCONTINUED | OUTPATIENT
Start: 2021-12-10 | End: 2021-12-15 | Stop reason: HOSPADM

## 2021-12-10 RX ORDER — SODIUM CHLORIDE, SODIUM LACTATE, POTASSIUM CHLORIDE, CALCIUM CHLORIDE 600; 310; 30; 20 MG/100ML; MG/100ML; MG/100ML; MG/100ML
INJECTION, SOLUTION INTRAVENOUS CONTINUOUS
Status: DISCONTINUED | OUTPATIENT
Start: 2021-12-10 | End: 2021-12-10 | Stop reason: HOSPADM

## 2021-12-10 RX ORDER — DIPHENHYDRAMINE HYDROCHLORIDE 50 MG/ML
12.5 INJECTION INTRAMUSCULAR; INTRAVENOUS
Status: DISCONTINUED | OUTPATIENT
Start: 2021-12-10 | End: 2021-12-10 | Stop reason: HOSPADM

## 2021-12-10 RX ORDER — ALBUTEROL SULFATE 90 UG/1
AEROSOL, METERED RESPIRATORY (INHALATION) PRN
Status: DISCONTINUED | OUTPATIENT
Start: 2021-12-10 | End: 2021-12-10 | Stop reason: SURG

## 2021-12-10 RX ORDER — BISACODYL 10 MG
10 SUPPOSITORY, RECTAL RECTAL
Status: DISCONTINUED | OUTPATIENT
Start: 2021-12-10 | End: 2021-12-15 | Stop reason: HOSPADM

## 2021-12-10 RX ORDER — BUPIVACAINE HYDROCHLORIDE 2.5 MG/ML
INJECTION, SOLUTION EPIDURAL; INFILTRATION; INTRACAUDAL
Status: COMPLETED | OUTPATIENT
Start: 2021-12-10 | End: 2021-12-10

## 2021-12-10 RX ORDER — OXYCODONE HYDROCHLORIDE 10 MG/1
10 TABLET ORAL
Status: DISCONTINUED | OUTPATIENT
Start: 2021-12-10 | End: 2021-12-11

## 2021-12-10 RX ORDER — HYDROMORPHONE HYDROCHLORIDE 1 MG/ML
0.5 INJECTION, SOLUTION INTRAMUSCULAR; INTRAVENOUS; SUBCUTANEOUS
Status: DISCONTINUED | OUTPATIENT
Start: 2021-12-10 | End: 2021-12-15

## 2021-12-10 RX ORDER — PANTOPRAZOLE SODIUM 40 MG/1
40 TABLET, DELAYED RELEASE ORAL DAILY
Status: DISCONTINUED | OUTPATIENT
Start: 2021-12-10 | End: 2021-12-10

## 2021-12-10 RX ORDER — ONDANSETRON 2 MG/ML
INJECTION INTRAMUSCULAR; INTRAVENOUS PRN
Status: DISCONTINUED | OUTPATIENT
Start: 2021-12-10 | End: 2021-12-10 | Stop reason: SURG

## 2021-12-10 RX ORDER — LIDOCAINE HYDROCHLORIDE 20 MG/ML
INJECTION, SOLUTION EPIDURAL; INFILTRATION; INTRACAUDAL; PERINEURAL PRN
Status: DISCONTINUED | OUTPATIENT
Start: 2021-12-10 | End: 2021-12-10 | Stop reason: SURG

## 2021-12-10 RX ORDER — TRAZODONE HYDROCHLORIDE 50 MG/1
100 TABLET ORAL NIGHTLY
Status: DISCONTINUED | OUTPATIENT
Start: 2021-12-10 | End: 2021-12-15 | Stop reason: HOSPADM

## 2021-12-10 RX ORDER — OMEPRAZOLE 20 MG/1
40 CAPSULE, DELAYED RELEASE ORAL DAILY
Status: DISCONTINUED | OUTPATIENT
Start: 2021-12-10 | End: 2021-12-15 | Stop reason: HOSPADM

## 2021-12-10 RX ORDER — CELECOXIB 200 MG/1
400 CAPSULE ORAL ONCE
Status: COMPLETED | OUTPATIENT
Start: 2021-12-10 | End: 2021-12-10

## 2021-12-10 RX ORDER — SERTRALINE HYDROCHLORIDE 100 MG/1
100 TABLET, FILM COATED ORAL DAILY
Status: DISCONTINUED | OUTPATIENT
Start: 2021-12-10 | End: 2021-12-15 | Stop reason: HOSPADM

## 2021-12-10 RX ORDER — MIDAZOLAM HYDROCHLORIDE 1 MG/ML
1 INJECTION INTRAMUSCULAR; INTRAVENOUS
Status: DISCONTINUED | OUTPATIENT
Start: 2021-12-10 | End: 2021-12-10 | Stop reason: HOSPADM

## 2021-12-10 RX ORDER — HYDROMORPHONE HYDROCHLORIDE 2 MG/ML
INJECTION, SOLUTION INTRAMUSCULAR; INTRAVENOUS; SUBCUTANEOUS PRN
Status: DISCONTINUED | OUTPATIENT
Start: 2021-12-10 | End: 2021-12-10 | Stop reason: SURG

## 2021-12-10 RX ORDER — ACETAMINOPHEN 500 MG
1000 TABLET ORAL EVERY 6 HOURS
Status: DISCONTINUED | OUTPATIENT
Start: 2021-12-10 | End: 2021-12-10

## 2021-12-10 RX ORDER — ACETAMINOPHEN 500 MG
1000 TABLET ORAL EVERY 6 HOURS PRN
Status: DISCONTINUED | OUTPATIENT
Start: 2021-12-15 | End: 2021-12-10

## 2021-12-10 RX ORDER — HYDROMORPHONE HYDROCHLORIDE 1 MG/ML
0.1 INJECTION, SOLUTION INTRAMUSCULAR; INTRAVENOUS; SUBCUTANEOUS
Status: DISCONTINUED | OUTPATIENT
Start: 2021-12-10 | End: 2021-12-10 | Stop reason: HOSPADM

## 2021-12-10 RX ORDER — SUCCINYLCHOLINE CHLORIDE 20 MG/ML
INJECTION INTRAMUSCULAR; INTRAVENOUS PRN
Status: DISCONTINUED | OUTPATIENT
Start: 2021-12-10 | End: 2021-12-10 | Stop reason: SURG

## 2021-12-10 RX ORDER — OXYCODONE HYDROCHLORIDE 5 MG/1
5 TABLET ORAL
Status: DISCONTINUED | OUTPATIENT
Start: 2021-12-10 | End: 2021-12-11

## 2021-12-10 RX ORDER — ENEMA 19; 7 G/133ML; G/133ML
1 ENEMA RECTAL
Status: DISCONTINUED | OUTPATIENT
Start: 2021-12-10 | End: 2021-12-15 | Stop reason: HOSPADM

## 2021-12-10 RX ORDER — TRAMADOL HYDROCHLORIDE 50 MG/1
50 TABLET ORAL EVERY 6 HOURS PRN
Status: DISCONTINUED | OUTPATIENT
Start: 2021-12-10 | End: 2021-12-14

## 2021-12-10 RX ORDER — HYDROMORPHONE HYDROCHLORIDE 1 MG/ML
0.4 INJECTION, SOLUTION INTRAMUSCULAR; INTRAVENOUS; SUBCUTANEOUS
Status: DISCONTINUED | OUTPATIENT
Start: 2021-12-10 | End: 2021-12-10 | Stop reason: HOSPADM

## 2021-12-10 RX ORDER — POLYETHYLENE GLYCOL 3350 17 G/17G
1 POWDER, FOR SOLUTION ORAL 2 TIMES DAILY
Status: DISCONTINUED | OUTPATIENT
Start: 2021-12-10 | End: 2021-12-15 | Stop reason: HOSPADM

## 2021-12-10 RX ORDER — CELECOXIB 200 MG/1
200 CAPSULE ORAL 2 TIMES DAILY
Status: DISCONTINUED | OUTPATIENT
Start: 2021-12-10 | End: 2021-12-15 | Stop reason: HOSPADM

## 2021-12-10 RX ORDER — BUPIVACAINE HYDROCHLORIDE AND EPINEPHRINE 5; 5 MG/ML; UG/ML
INJECTION, SOLUTION EPIDURAL; INTRACAUDAL; PERINEURAL
Status: DISCONTINUED | OUTPATIENT
Start: 2021-12-10 | End: 2021-12-10 | Stop reason: HOSPADM

## 2021-12-10 RX ORDER — AMOXICILLIN 250 MG
1 CAPSULE ORAL
Status: DISCONTINUED | OUTPATIENT
Start: 2021-12-10 | End: 2021-12-15 | Stop reason: HOSPADM

## 2021-12-10 RX ADMIN — SODIUM CHLORIDE, POTASSIUM CHLORIDE, SODIUM LACTATE AND CALCIUM CHLORIDE: 600; 310; 30; 20 INJECTION, SOLUTION INTRAVENOUS at 14:28

## 2021-12-10 RX ADMIN — ALBUTEROL SULFATE 4 PUFF: 90 AEROSOL, METERED RESPIRATORY (INHALATION) at 08:53

## 2021-12-10 RX ADMIN — SUGAMMADEX 200 MG: 100 INJECTION, SOLUTION INTRAVENOUS at 10:07

## 2021-12-10 RX ADMIN — SODIUM CHLORIDE, POTASSIUM CHLORIDE, SODIUM LACTATE AND CALCIUM CHLORIDE: 600; 310; 30; 20 INJECTION, SOLUTION INTRAVENOUS at 07:50

## 2021-12-10 RX ADMIN — FENTANYL CITRATE 25 MCG: 50 INJECTION INTRAMUSCULAR; INTRAVENOUS at 10:27

## 2021-12-10 RX ADMIN — SERTRALINE HYDROCHLORIDE 100 MG: 100 TABLET ORAL at 05:25

## 2021-12-10 RX ADMIN — ACETAMINOPHEN 1000 MG: 500 TABLET ORAL at 15:01

## 2021-12-10 RX ADMIN — ARIPIPRAZOLE 5 MG: 5 TABLET ORAL at 17:35

## 2021-12-10 RX ADMIN — HYDROMORPHONE HYDROCHLORIDE 0.3 MG: 2 INJECTION, SOLUTION INTRAMUSCULAR; INTRAVENOUS; SUBCUTANEOUS at 07:59

## 2021-12-10 RX ADMIN — HYDROMORPHONE HYDROCHLORIDE 0.4 MG: 1 INJECTION, SOLUTION INTRAMUSCULAR; INTRAVENOUS; SUBCUTANEOUS at 13:51

## 2021-12-10 RX ADMIN — PIPERACILLIN AND TAZOBACTAM 3.38 G: 3; .375 INJECTION, POWDER, LYOPHILIZED, FOR SOLUTION INTRAVENOUS; PARENTERAL at 05:25

## 2021-12-10 RX ADMIN — HYDROMORPHONE HYDROCHLORIDE 0.2 MG: 1 INJECTION, SOLUTION INTRAMUSCULAR; INTRAVENOUS; SUBCUTANEOUS at 11:19

## 2021-12-10 RX ADMIN — ONDANSETRON 4 MG: 2 INJECTION INTRAMUSCULAR; INTRAVENOUS at 07:55

## 2021-12-10 RX ADMIN — ROCURONIUM BROMIDE 5 MG: 10 INJECTION, SOLUTION INTRAVENOUS at 07:52

## 2021-12-10 RX ADMIN — ROCURONIUM BROMIDE 15 MG: 10 INJECTION, SOLUTION INTRAVENOUS at 09:25

## 2021-12-10 RX ADMIN — OMEPRAZOLE 40 MG: 20 CAPSULE, DELAYED RELEASE ORAL at 05:25

## 2021-12-10 RX ADMIN — HYDROMORPHONE HYDROCHLORIDE 0.4 MG: 2 INJECTION, SOLUTION INTRAMUSCULAR; INTRAVENOUS; SUBCUTANEOUS at 09:51

## 2021-12-10 RX ADMIN — ONDANSETRON 4 MG: 2 INJECTION INTRAMUSCULAR; INTRAVENOUS at 15:45

## 2021-12-10 RX ADMIN — DOCUSATE SODIUM 50 MG AND SENNOSIDES 8.6 MG 1 TABLET: 8.6; 5 TABLET, FILM COATED ORAL at 21:31

## 2021-12-10 RX ADMIN — TRAZODONE HYDROCHLORIDE 100 MG: 100 TABLET ORAL at 21:32

## 2021-12-10 RX ADMIN — FENTANYL CITRATE 50 MCG: 50 INJECTION INTRAMUSCULAR; INTRAVENOUS at 10:34

## 2021-12-10 RX ADMIN — TRAZODONE HYDROCHLORIDE 100 MG: 100 TABLET ORAL at 01:17

## 2021-12-10 RX ADMIN — DOCUSATE SODIUM 100 MG: 100 CAPSULE ORAL at 17:35

## 2021-12-10 RX ADMIN — ROCURONIUM BROMIDE 45 MG: 10 INJECTION, SOLUTION INTRAVENOUS at 07:57

## 2021-12-10 RX ADMIN — LIDOCAINE HYDROCHLORIDE 100 MG: 20 INJECTION, SOLUTION EPIDURAL; INFILTRATION; INTRACAUDAL at 07:55

## 2021-12-10 RX ADMIN — CELECOXIB 400 MG: 200 CAPSULE ORAL at 15:02

## 2021-12-10 RX ADMIN — SUCCINYLCHOLINE CHLORIDE 100 MG: 20 INJECTION, SOLUTION INTRAMUSCULAR; INTRAVENOUS; PARENTERAL at 07:55

## 2021-12-10 RX ADMIN — LISINOPRIL 10 MG: 10 TABLET ORAL at 05:25

## 2021-12-10 RX ADMIN — ONDANSETRON 4 MG: 2 INJECTION INTRAMUSCULAR; INTRAVENOUS at 13:48

## 2021-12-10 RX ADMIN — BUPIVACAINE HYDROCHLORIDE 60 ML: 2.5 INJECTION, SOLUTION EPIDURAL; INFILTRATION; INTRACAUDAL; PERINEURAL at 10:04

## 2021-12-10 RX ADMIN — ROCURONIUM BROMIDE 15 MG: 10 INJECTION, SOLUTION INTRAVENOUS at 08:47

## 2021-12-10 RX ADMIN — HYDROMORPHONE HYDROCHLORIDE 0.2 MG: 1 INJECTION, SOLUTION INTRAMUSCULAR; INTRAVENOUS; SUBCUTANEOUS at 11:28

## 2021-12-10 RX ADMIN — HYDROMORPHONE HYDROCHLORIDE 0.5 MG: 2 INJECTION, SOLUTION INTRAMUSCULAR; INTRAVENOUS; SUBCUTANEOUS at 09:04

## 2021-12-10 RX ADMIN — PIPERACILLIN AND TAZOBACTAM 3.38 G: 3; .375 INJECTION, POWDER, LYOPHILIZED, FOR SOLUTION INTRAVENOUS; PARENTERAL at 00:01

## 2021-12-10 RX ADMIN — HYDROCODONE BITARTRATE AND ACETAMINOPHEN 15 MG: 7.5; 325 SOLUTION ORAL at 10:32

## 2021-12-10 RX ADMIN — FENTANYL CITRATE 25 MCG: 50 INJECTION INTRAMUSCULAR; INTRAVENOUS at 10:47

## 2021-12-10 RX ADMIN — HYDROCODONE BITARTRATE AND ACETAMINOPHEN 1 TABLET: 5; 325 TABLET ORAL at 21:32

## 2021-12-10 RX ADMIN — PIPERACILLIN AND TAZOBACTAM 3.38 G: 3; .375 INJECTION, POWDER, LYOPHILIZED, FOR SOLUTION INTRAVENOUS; PARENTERAL at 21:32

## 2021-12-10 RX ADMIN — PROPOFOL 140 MG: 10 INJECTION, EMULSION INTRAVENOUS at 07:55

## 2021-12-10 RX ADMIN — ACETAMINOPHEN 650 MG: 325 TABLET, FILM COATED ORAL at 05:24

## 2021-12-10 RX ADMIN — DEXAMETHASONE SODIUM PHOSPHATE 4 MG: 4 INJECTION, SOLUTION INTRA-ARTICULAR; INTRALESIONAL; INTRAMUSCULAR; INTRAVENOUS; SOFT TISSUE at 07:55

## 2021-12-10 RX ADMIN — ATORVASTATIN CALCIUM 20 MG: 20 TABLET, FILM COATED ORAL at 05:25

## 2021-12-10 RX ADMIN — PIPERACILLIN AND TAZOBACTAM 3.38 G: 3; .375 INJECTION, POWDER, LYOPHILIZED, FOR SOLUTION INTRAVENOUS; PARENTERAL at 15:02

## 2021-12-10 ASSESSMENT — LIFESTYLE VARIABLES
CONSUMPTION TOTAL: NEGATIVE
TOTAL SCORE: 0
TOTAL SCORE: 0
EVER_SMOKED: NEVER
DOES PATIENT WANT TO STOP DRINKING: NO
EVER HAD A DRINK FIRST THING IN THE MORNING TO STEADY YOUR NERVES TO GET RID OF A HANGOVER: NO
AVERAGE NUMBER OF DAYS PER WEEK YOU HAVE A DRINK CONTAINING ALCOHOL: 0
EVER FELT BAD OR GUILTY ABOUT YOUR DRINKING: NO
ON A TYPICAL DAY WHEN YOU DRINK ALCOHOL HOW MANY DRINKS DO YOU HAVE: 0
HOW MANY TIMES IN THE PAST YEAR HAVE YOU HAD 5 OR MORE DRINKS IN A DAY: 0
ALCOHOL_USE: NO
TOTAL SCORE: 0
HAVE YOU EVER FELT YOU SHOULD CUT DOWN ON YOUR DRINKING: NO
HAVE PEOPLE ANNOYED YOU BY CRITICIZING YOUR DRINKING: NO

## 2021-12-10 ASSESSMENT — COGNITIVE AND FUNCTIONAL STATUS - GENERAL
DRESSING REGULAR LOWER BODY CLOTHING: A LITTLE
CLIMB 3 TO 5 STEPS WITH RAILING: A LITTLE
DRESSING REGULAR UPPER BODY CLOTHING: A LITTLE
STANDING UP FROM CHAIR USING ARMS: A LITTLE
HELP NEEDED FOR BATHING: A LITTLE
SUGGESTED CMS G CODE MODIFIER DAILY ACTIVITY: CJ
DAILY ACTIVITIY SCORE: 21
SUGGESTED CMS G CODE MODIFIER MOBILITY: CJ
MOVING TO AND FROM BED TO CHAIR: A LITTLE
MOBILITY SCORE: 21

## 2021-12-10 ASSESSMENT — PAIN DESCRIPTION - PAIN TYPE
TYPE: SURGICAL PAIN

## 2021-12-10 ASSESSMENT — COPD QUESTIONNAIRES
COPD SCREENING SCORE: 2
HAVE YOU SMOKED AT LEAST 100 CIGARETTES IN YOUR ENTIRE LIFE: NO/DON'T KNOW
DURING THE PAST 4 WEEKS HOW MUCH DID YOU FEEL SHORT OF BREATH: SOME OF THE TIME
DO YOU EVER COUGH UP ANY MUCUS OR PHLEGM?: NO/ONLY WITH OCCASIONAL COLDS OR INFECTIONS

## 2021-12-10 ASSESSMENT — PATIENT HEALTH QUESTIONNAIRE - PHQ9
2. FEELING DOWN, DEPRESSED, IRRITABLE, OR HOPELESS: NOT AT ALL
SUM OF ALL RESPONSES TO PHQ9 QUESTIONS 1 AND 2: 0
1. LITTLE INTEREST OR PLEASURE IN DOING THINGS: NOT AT ALL

## 2021-12-10 ASSESSMENT — PAIN SCALES - GENERAL: PAIN_LEVEL: 6

## 2021-12-10 NOTE — H&P
"      CHIEF COMPLAINT: Right upper quadrant abdominal pain    HISTORY OF PRESENT ILLNESS: 51-year-old female with history of right upper quadrant pain that has become increasingly worse over the last few weeks.  Patient presents today for evaluation due to the pain.  She denies significant changes in her appetite but she has intentionally lost 50pounds.  Patient used to be on insulin but is no longer taking that.  Patient states pain is worse after eating and she was in the emergency room back in October on was recommended to a surgeon for cholecystectomy but did not follow-up.  She denies fevers chills, nausea or vomiting.  She has no other complaints    PAST MEDICAL HISTORY: History of upper GI bleed due to ulcer in 2017.  She underwent endoscopy for this.  She is on proton pump inhibitors.  She also has depression, diet-controlled diabetes, hypertension and hyperlipidemia    PAST SURGICAL HISTORY: Tubal ligation    ALLERGIES:   Allergies   Allergen Reactions   • Compazine Hives and Unspecified     Hives  \"Feeling like wanting to crawl out of her skin\" per pt       CURRENT MEDICATIONS:   Home Medications     Reviewed by Hi Haddad (Pharmacy Tech) on 12/09/21 at 1930  Med List Status: Complete   Medication Last Dose Status   ARIPiprazole (ABILIFY) 5 MG tablet 12/8/2021 Active   atorvastatin (LIPITOR) 20 MG Tab 12/9/2021 Active   Chlorpheniramine Maleate (ALLERGY PO) 12/9/2021 Active   Cholecalciferol 2000 UNIT Cap 12/9/2021 Active   lisinopril (PRINIVIL) 10 MG Tab 12/9/2021 Active   pantoprazole (PROTONIX) 40 MG Tablet Delayed Response 12/9/2021 Active   sertraline (ZOLOFT) 100 MG Tab 12/9/2021 Active   traZODone (DESYREL) 100 MG Tab 12/8/2021 Active                FAMILY HISTORY: family history is not on file.    SOCIAL HISTORY:  reports that she has never smoked. She has never used smokeless tobacco. She reports that she does not drink alcohol and does not use drugs.    REVIEW OF SYSTEMS: Review of " "systems is remarkable for the following Postprandial right upper quadrant abdominal pain. The remainder of the comprehensive ROS is negative with the exception of the aforementioned HPI, PMH, and PSH bullets in accordance with CMS guideline.    PHYSICAL EXAMINATION:      Constitutional:     Vital Signs: BP (!) 175/112   Pulse (!) 103   Temp 37.2 °C (98.9 °F) (Temporal)   Resp 19   Ht 1.854 m (6' 1\")   Wt 118 kg (261 lb 0.4 oz)   SpO2 97%    General Appearance: The patient is a healthy-appearing female who looks her stated age.  HEENT:    No jaundice or icterus.  Mucous membranes moist.  Neck:    Supple with midline trachea  Respiratory:   Inspection: Unlabored respirations, no intercostal retractions, paradoxical motion, or accessory muscle use.   Auscultation: clear to auscultation.  Cardiovascular:   Inspection: The skin is warm and well purfused.  Auscultation: Regular rate and rhythm.   Peripheral Pulses: Normal.   Abdomen:   Palpation: Palpation is remarkable for right upper quadrant pain with rebound.  Otherwise soft and nontender  Musculoskeletal:   No cyanosis edema or deformity  Skin:    Examination of the skin reveals no jaundice.  Neurologic:   Neurologic examination reveals no focal deficits noted.    LABORATORY VALUES:   Recent Labs     12/09/21  1759   WBC 8.6   RBC 4.45   HEMOGLOBIN 13.7   HEMATOCRIT 41.1   MCV 92.4   MCH 30.8   MCHC 33.3*   RDW 43.1   PLATELETCT 396   MPV 9.0     Recent Labs     12/09/21  1759   SODIUM 138   POTASSIUM 4.0   CHLORIDE 104   CO2 22   GLUCOSE 204*   BUN 10   CREATININE 0.63   CALCIUM 9.3     Recent Labs     12/09/21  1759   ASTSGOT 25   ALTSGPT 38   TBILIRUBIN 0.2   ALKPHOSPHAT 146*   GLOBULIN 3.3            IMAGING:   US-RUQ   Final Result      1.  Marked gallbladder wall thickening and reportedly tenderness over the gallbladder without evidence of gallstone suggesting acalculous cholecystitis.      2.  Echogenic liver suggesting hepatic steatosis.    "       ASSESSMENT AND PLAN:   51-year-old female with right upper quadrant abdominal pain and ultrasound findings consistent with cholecystitis.  Admit to the surgical service.  N.p.o., IV antibiotics and IV fluids.  Lovenox ordered.  Home med reconciliation completed.  I described the procedure of laparoscopic cholecystectomy including its attendant risks which include but are not limited to: Wound infection, incisional hernia, organ space infection, bleeding, bile duct injury, bile leak and conversion open procedure.  Patient understands these risks and wishes to proceed with the procedure.  She has been added to the morning line at 730.  Dr. Valiente will see the patient in the morning prior to surgery.         ____________________________________     Sony Castillo D.O.    DD: 12/9/2021  7:54 PM

## 2021-12-10 NOTE — ANESTHESIA PROCEDURE NOTES
Peripheral Block    Date/Time: 12/10/2021 10:04 AM  Performed by: Jasbir Omer M.D.  Authorized by: Jasbir Omer M.D.     Patient Location:  OR  Start Time:  12/10/2021 10:04 AM  End Time:  12/10/2021 10:08 AM  Reason for Block: at surgeon's request and post-op pain management ONLY    patient identified, IV checked, site marked, risks and benefits discussed, surgical consent, monitors and equipment checked, pre-op evaluation and timeout performed    Patient Position:  Supine  Prep: ChloraPrep    Monitoring:  Continuous pulse ox, cardiac monitor and heart rate  Block Region:  Trunk  Trunk - Block Type:  Abdominal plane block - TAP block    Laterality:  Bilateral  Procedures: ultrasound guided  Image captured, interpreted and electronically stored.  Block Type:  Single-shot  Needle Length:  150mm  Needle Gauge:  20 G  Needle Localization:  Ultrasound guidance

## 2021-12-10 NOTE — OR NURSING
1016 Patient arrived to PACU from OR.  Report from anesthesia and RN.  Patient with oral airway in place.  Incision to abdomen is clean, dry and soft. JORGE LUIS drain in place.  1040 Patient medicated per MAR for pain. Sister updated.  1130 Patient reports an improvement in pain.  1230 Patient resting in [facility], tolerating oral intake.  1335 Report to Dana GRIGSBY.  1352 Patient ambulated to bathroom, tolerated well.  Voided.  Back to [facility].  Medicated for pain.  1410 Patient reports an improvement in pain.  1416 Patient transferred to [facility] via [facility] by CNA.  Belongings sent with patient.

## 2021-12-10 NOTE — ANESTHESIA TIME REPORT
Anesthesia Start and Stop Event Times     Date Time Event    12/10/2021 0731 Ready for Procedure     0750 Anesthesia Start     1024 Anesthesia Stop        Responsible Staff  12/10/21    Name Role Begin End    Jasbir Omer M.D. Anesth 0750 1024        Preop Diagnosis (Free Text):  Pre-op Diagnosis     Acute Cholecystitis        Preop Diagnosis (Codes):    Premium Reason  G. Contracted, Vacation    Comments:

## 2021-12-10 NOTE — PROGRESS NOTES
PREOPERATIVE NOTE: I have seen and examined the patient today.  Critical physical examination findings, laboratory indices, and radiographic studies reviewed.  I recommend  laparoscopic cholecystectomy / possible open cholecystectomy.    The operative strategy was explained and reviewed. Alternatives discussed. Potential complications including, but not limited to, infection, bleeding, damage to adjacent structures, and anesthetic complications were discussed. Operative consent signed.  Admission SARS-CoV-2 testing negative. LOW RISK patient. Repeat SARS-CoV-2 testing not indicated. Isolation precautions de-escalated.         ____________________________________     Ernie Valiente M.D.    DD: 12/10/2021  7:46 AM

## 2021-12-10 NOTE — PROGRESS NOTES
SBAR report provided to pre-op RN Dorie Moralez. Patient scheduled for transport at or around 0700.

## 2021-12-10 NOTE — ASSESSMENT & PLAN NOTE
RUQ pain  U/S shows: Marked gallbladder wall thickening and reportedly tenderness over the gallbladder without evidence of gallstone suggesting acalculous cholecystitis.   Echogenic liver suggesting hepatic steatosis.  12/10 to OR for lap khanh converted to open khanh

## 2021-12-10 NOTE — OR SURGEON
Immediate Post OP Note    PreOp Diagnosis: Acute cholecystitis - acalculous    PostOp Diagnosis: same    Procedure(s):  ATTEMPTED LAPAROSCOPIC CONVERTED TO OPEN CHOLECYSTECTOMY - Wound Class: Contaminated    Surgeon(s):  Ernie Valiente M.D.    Assistant Surgeon:   Jillian Arauz M.D.    Anesthesiologist/Type of Anesthesia: GETA / local28 cc 0.5% maracaine with epiFriedkaren Arauz M.D.  Anesthesiologist: Jasbir Omer M.D./General    Surgical Staff:  Circulator: Guerline Valdivia R.N.  Relief Circulator: Alison Wilson R.N.  Scrub Person: Rohit Burgos  Count [place]: Crystal Crockett R.N.    Specimens removed if any:  ID Type Source Tests Collected by Time Destination   A : Gallbladder Tissue Gallbladder PATHOLOGY SPECIMEN Ernie Valiente M.D. 12/10/2021  9:04 AM    B : Gallbladder Tissue Gallbladder PATHOLOGY SPECIMEN Ernie Valiente M.D. 12/10/2021  9:43 AM        Estimated Blood Loss: 100 cc    Findings: marked inflammation, thickened gallbladder wall with dense omental adhesions and duodenum stuck medially and laterally.  Specimen to pathology for frozen to evaluate for possible cancer    Complications: conversion to open cholecystectomy    Drain - 10 mm flat drain - subhepatic      12/10/2021 10:09 AM Ernie Valiente M.D.

## 2021-12-10 NOTE — ED NOTES
Nursing Home Visit    NAME: Concepcion Nur  AGE: 78 y o  SEX: male [id number]    DATE OF ENCOUNTER: 12/18/2019    Nursing home/assisted living name: [facility]  Patient room number: 197  Code status: Full code   Patient has been legally declared an incapacitated person and a Elverson Haydee of the [facility] has been appointed Leila Martínez    Assessment and Plan     Problem List Items Addressed This Visit        Cardiovascular and Mediastinum    Essential hypertension     -/91, at goal today (BP goal <150/90)  -Reviewed BP log, has been at goal overall  -Continue HCTZ 12 5 mg QD, well-tolerated by patient  -Cr 0 86, K 3 5 on 10/31/19 labs  -Previous hypokalemia resolved but remains at lower limit of normal; likely 2/2 thiazide usage  -Continue potassium supplementation 20 mEq QD  -Recommend BMP Q6 months (repeat May 2020)            Nervous and Auditory    Dementia ([facility] [address] ) - Primary     -Patient stable from previous visit, remains pleasantly confused with stable mood, denies depression or anxiety symptoms  -AOx1 on exam today with poor recall; no behavioral disturbances  -Continue donepezil 10 mg QD  -Donepezil well-tolerated, with no bradycardia per review of VS log  -Continue ongoing monitoring for bradycardia at all visits  -Previous B12 deficiency resolved (B12 142 4/2019, improved to 776 10/31/19); continue B12 replacement via B12 1000 mcg QD  -Consider repeat B12 level in 6 months from previous (May 2020)            Musculoskeletal and Integument    Osteoarthritis     -Asymptomatic of OA of knees, but reports neck pain as noted below  -Continue tylenol 325 mg Q4H PRN for pain (TDD<3g/day)            Genitourinary    OAB (overactive bladder)     -Documented hx of OAB with urinary incontinence  -Not currently on any medications for OAB  -Patient denies any urinary symptoms today  -Continue current management via standard UI Report to CALISTA Atkins.    supplies            Other    Iron deficiency anemia     -Patient with previous normocytic, normochromic anemia in April 2019, prompting initiation of ferrous sulfate at last visit 10/31/19; well-tolerated, denies constipation  -Repeat labs 10/31/19 showed Hb 14 7, ferritin 29  -No evidence of ongoing losses, renal function WNL  -Plan to repeat CBC in February 2020 (at next visit)         Pre-diabetes     -Last HbA1C 5 9% on 4/30/19  -Glucose 92 on 10/31/19 labs  -May consider repeating HbA1C with next labs May 2020  -Encouraged to limit dietary carb intake          Cervicalgia     -Documented hx of cervicalgia, patient reporting mild neck pain, intermittent, mild hypertonicity on exam but non-tender   -Suspect patient forgets he has option to ask for tylenol  -Encouraged patient to report neck pain to nursing staff so they may give him his PRN tylenol dosing; pain not severe enough to warrant scheduled tylenol at this time  -Continue tylenol 325 mg Q4H PRN for pain (TDD<3g/day)  -Will add voltaren gel 4x/day PRN for neck pain                 Chief Complaint     Follow up nursing home visit    History of Present Illness     HPI    Patient seen and examined in his room today watching [employer]  No complaints other than some mild neck pain that bothers him at night; he has tylenol ordered PRN but believes he has not had any  Otherwise, he says he is "in good spirits", eating and drinking well, doesn't like to socialize very much with the other residents but says "I get around when I want to " No issues since starting iron supplements at last visit  Had bloodwork 10/31/19 that showed Hb 14 7 but ferritin 29, B12 776, glucose 92, Cr 0 86, K 3 5, otherwise WNL  Tolerating ferrous sulfate well, denies constipation  No other issues or complaints today  No behavioral disturbances per staff        The following portions of the patient's history were reviewed and updated as appropriate: allergies, current medications, past family history, past medical history, past social history, past surgical history and problem list     Review of Systems     Review of Systems   Constitutional: Negative for chills, fatigue and fever  Eyes: Negative for visual disturbance  Respiratory: Negative for chest tightness and shortness of breath  Cardiovascular: Negative for chest pain, palpitations and leg swelling  Gastrointestinal: Negative for abdominal pain, constipation, diarrhea, nausea and vomiting  Genitourinary: Negative for dysuria  OAB symptoms stable, denies any issues   Musculoskeletal: Positive for neck pain  Negative for arthralgias and gait problem  Reports overgrown toenails resolved, currently "neat and trim"   Skin: Negative for rash  Neurological: Negative for syncope, weakness and light-headedness  Psychiatric/Behavioral: Positive for confusion  Negative for agitation, behavioral problems, dysphoric mood, hallucinations, self-injury, sleep disturbance and suicidal ideas  The patient is not nervous/anxious  All other systems reviewed and are negative  Active Problem List     Patient Active Problem List   Diagnosis    Dementia ([facility] [address] )    History of gastroesophageal reflux (GERD)    Left ventricular hypertrophy by electrocardiogram    Iron deficiency anemia    B12 deficiency    Hypokalemia    Essential hypertension    Overgrown toenails    Osteoarthritis    Pre-diabetes    OAB (overactive bladder)         Current Medications   Medications reviewed and updated in facility chart        Current Outpatient Medications:     acetaminophen (TYLENOL) 325 mg tablet, Take 2 tablets (650 mg total) by mouth every 6 (six) hours as needed for fever, Disp: 30 tablet, Rfl: 0    cyanocobalamin 1000 MCG tablet, Take 1 tablet (1,000 mcg total) by mouth daily, Disp: 30 tablet, Rfl: 0    donepezil (ARICEPT) 10 mg tablet, Take 10 mg by mouth daily at bedtime, Disp: , Rfl:     hydrochlorothiazide (HYDRODIURIL) 12 5 mg tablet, Take 1 tablet (12 5 mg total) by mouth daily, Disp: 30 tablet, Rfl: 0    potassium chloride (K-DUR,KLOR-CON) 20 mEq tablet, Take 1 tablet (20 mEq total) by mouth 2 (two) times a day, Disp: 60 tablet, Rfl: 0     Objective     /91   Pulse 74   Wt 99 5 kg (219 lb 6 4 oz)   BMI 33 36 kg/m²     Physical Exam   Constitutional: He appears well-developed and well-nourished  No distress  HENT:   Head: Normocephalic and atraumatic  Eyes: Conjunctivae are normal  Right eye exhibits no discharge  Left eye exhibits no discharge  Neck: Normal range of motion  Neck supple  Mild hypertonicity of posterior cervical paravertebral musculature without tenderness to palpation   Cardiovascular: Normal rate, regular rhythm and intact distal pulses  Pulmonary/Chest: Effort normal and breath sounds normal  No respiratory distress  Abdominal: Soft  Bowel sounds are normal  There is no tenderness  Musculoskeletal: Normal range of motion  He exhibits no edema  Neurological: He is alert  Oriented to self only   Skin: Skin is warm and dry  Capillary refill takes less than 2 seconds  No rash noted  Psychiatric: He has a normal mood and affect  His behavior is normal    Vitals reviewed        Pertinent Laboratory/Diagnostic Studies from 10/31/19:  CBC: Hb 14 7, ferritin 29  B12: 776  BMP: glucose 92, Cr 0 86, K 3 5

## 2021-12-10 NOTE — ED TRIAGE NOTES
Chief Complaint   Patient presents with   • Abdominal Pain     intermittent, gallbladder pain ruq     Pt seen in October and advised to have gallbladder removed. Reports intermittent increased pain.

## 2021-12-10 NOTE — ED NOTES
Breaking primary RN:     Dr. Castillo at bedside. Pt to be admitted and have surgery in the morning.

## 2021-12-10 NOTE — CARE PLAN
The patient is Stable - Low risk of patient condition declining or worsening    Shift Goals  Clinical Goals: Tolerate diet, ambulate  Patient Goals: Pain control    Progress made toward(s) clinical / shift goals:    Problem: Pain - Standard  Goal: Alleviation of pain or a reduction in pain to the patient’s comfort goal  Outcome: Progressing     Problem: Knowledge Deficit - Standard  Goal: Patient and family/care givers will demonstrate understanding of plan of care, disease process/condition, diagnostic tests and medications  Outcome: Progressing       Patient is not progressing towards the following goals:

## 2021-12-10 NOTE — PROGRESS NOTES
Received bedside report from CALISTA Sharma, pt care assumed. VS WDL, pt assessment complete. Pt A&Ox4, no c/o pain at this time. POC discussed with pt and verbalizes no questions. Pt denies any additional needs at this time. Bed locked and in lowest position. Pt educated on fall risk and verbalized understanding, call light within reach, hourly rounding initiated.

## 2021-12-10 NOTE — ANESTHESIA POSTPROCEDURE EVALUATION
Patient: Aixa Eastman    Procedure Summary     Date: 12/10/21 Room / Location: [place] [id number] / SURGERY [facility]    Anesthesia Start: 0750 Anesthesia Stop: 1024    Procedure: ATTEMPTED LAPAROSCOPIC CONVERTED TO OPEN CHOLECYSTECTOMY (N/A Abdomen) Diagnosis: (Acute Cholecystitis)    Surgeons: Ernie Valiente M.D. Responsible Provider: Jasbir Omer M.D.    Anesthesia Type: general ASA Status: 2          Final Anesthesia Type: general  Last vitals  BP   Blood Pressure: (!) 174/81    Temp   36.3 °C (97.4 °F)    Pulse   86   Resp   16    SpO2   93 %      Anesthesia Post Evaluation    Patient location during evaluation: PACU  Patient participation: complete - patient participated  Level of consciousness: awake and alert  Pain score: 6    Airway patency: patent  Anesthetic complications: no  Cardiovascular status: adequate and hemodynamically stable  Respiratory status: acceptable  Hydration status: acceptable    PONV: none          No complications documented.     Nurse Pain Score: 2 (NPRS)

## 2021-12-10 NOTE — PROGRESS NOTES
Pt arrive to T401.  VSS. Alert and oriented. Ambulated from gurney to bed with steady gait.  Lap sites and transverse incision with jpx1 CDI.   No areas of skin breakdown.   Oriented to room and hospital rules.  Sister at bedside.

## 2021-12-10 NOTE — ED NOTES
Med Rec completed per patient and med list at bedside.  Allergies reviewed  No ORAL antibiotics in last 30 days

## 2021-12-10 NOTE — PROGRESS NOTES
Patient transported to Pre-Op via gurney escorted by transport tech. Personal belongings sent with patient.

## 2021-12-10 NOTE — ANESTHESIA PREPROCEDURE EVALUATION
Case: [id number] Date/Time: 12/10/21 0715    Procedure: CHOLECYSTECTOMY, LAPAROSCOPIC    Location: [place] OR 09 / SURGERY [facility]    Surgeons: Ernie Valiente M.D.        51yoF  Obese  DMII- diet control  HTN - on lisinopril    Denies heart/lung disease    Allergies to compazine  NPO  No AC    Relevant Problems   No relevant active problems       Physical Exam    Airway   Mallampati: III       Cardiovascular - normal exam     Dental - normal exam           Pulmonary - normal exam     Abdominal - normal exam  (+) obese     Neurological - normal exam                 Anesthesia Plan    ASA 2       Plan - general and peripheral nerve block     Peripheral nerve block will be post-op pain control  Airway plan will be ETT          Induction: rapid sequence and intravenous    Postoperative Plan: Postoperative administration of opioids is intended.    Pertinent diagnostic labs and testing reviewed    Informed Consent:    Anesthetic plan and risks discussed with patient.

## 2021-12-10 NOTE — ANESTHESIA PROCEDURE NOTES
Airway    Date/Time: 12/10/2021 7:56 AM  Performed by: Jasbir Omer M.D.  Authorized by: Jasbir Omer M.D.     Location:  OR  Urgency:  Elective  Indications for Airway Management:  Anesthesia      Spontaneous Ventilation: absent    Sedation Level:  Deep  Preoxygenated: Yes    Mask Difficulty Assessment:  1 - vent by mask  Final Airway Type:  Endotracheal airway  Final Endotracheal Airway:  ETT  Cuffed: Yes    Technique Used for Successful ETT Placement:  Direct laryngoscopy  Devices/Methods Used in Placement:  Intubating stylet    Insertion Site:  Oral  Blade Type:  Bashir  Laryngoscope Blade/Videolaryngoscope Blade Size:  2  ETT Size (mm):  7.0  Measured from:  Lips  ETT to Lips (cm):  22  Placement Verified by: capnometry    Cormack-Lehane Classification:  Grade I - full view of glottis  Number of Attempts at Approach:  1

## 2021-12-11 LAB
ALBUMIN SERPL BCP-MCNC: 3.5 G/DL (ref 3.2–4.9)
ALBUMIN/GLOB SERPL: 1.5 G/DL
ALP SERPL-CCNC: 146 U/L (ref 30–99)
ALT SERPL-CCNC: 184 U/L (ref 2–50)
ANION GAP SERPL CALC-SCNC: 7 MMOL/L (ref 7–16)
AST SERPL-CCNC: 99 U/L (ref 12–45)
BASOPHILS # BLD AUTO: 0.4 % (ref 0–1.8)
BASOPHILS # BLD: 0.04 K/UL (ref 0–0.12)
BILIRUB SERPL-MCNC: 0.4 MG/DL (ref 0.1–1.5)
BUN SERPL-MCNC: 8 MG/DL (ref 8–22)
CALCIUM SERPL-MCNC: 8.8 MG/DL (ref 8.5–10.5)
CHLORIDE SERPL-SCNC: 103 MMOL/L (ref 96–112)
CO2 SERPL-SCNC: 28 MMOL/L (ref 20–33)
CREAT SERPL-MCNC: 0.61 MG/DL (ref 0.5–1.4)
EOSINOPHIL # BLD AUTO: 0.02 K/UL (ref 0–0.51)
EOSINOPHIL NFR BLD: 0.2 % (ref 0–6.9)
ERYTHROCYTE [DISTWIDTH] IN BLOOD BY AUTOMATED COUNT: 43.7 FL (ref 35.9–50)
GLOBULIN SER CALC-MCNC: 2.3 G/DL (ref 1.9–3.5)
GLUCOSE SERPL-MCNC: 157 MG/DL (ref 65–99)
HCT VFR BLD AUTO: 38.2 % (ref 37–47)
HGB BLD-MCNC: 12.3 G/DL (ref 12–16)
IMM GRANULOCYTES # BLD AUTO: 0.02 K/UL (ref 0–0.11)
IMM GRANULOCYTES NFR BLD AUTO: 0.2 % (ref 0–0.9)
LYMPHOCYTES # BLD AUTO: 1.73 K/UL (ref 1–4.8)
LYMPHOCYTES NFR BLD: 17.8 % (ref 22–41)
MCH RBC QN AUTO: 30.7 PG (ref 27–33)
MCHC RBC AUTO-ENTMCNC: 32.2 G/DL (ref 33.6–35)
MCV RBC AUTO: 95.3 FL (ref 81.4–97.8)
MONOCYTES # BLD AUTO: 0.56 K/UL (ref 0–0.85)
MONOCYTES NFR BLD AUTO: 5.8 % (ref 0–13.4)
NEUTROPHILS # BLD AUTO: 7.33 K/UL (ref 2–7.15)
NEUTROPHILS NFR BLD: 75.6 % (ref 44–72)
NRBC # BLD AUTO: 0 K/UL
NRBC BLD-RTO: 0 /100 WBC
PLATELET # BLD AUTO: 327 K/UL (ref 164–446)
PMV BLD AUTO: 9.1 FL (ref 9–12.9)
POTASSIUM SERPL-SCNC: 4.1 MMOL/L (ref 3.6–5.5)
PROT SERPL-MCNC: 5.8 G/DL (ref 6–8.2)
RBC # BLD AUTO: 4.01 M/UL (ref 4.2–5.4)
SODIUM SERPL-SCNC: 138 MMOL/L (ref 135–145)
WBC # BLD AUTO: 9.7 K/UL (ref 4.8–10.8)

## 2021-12-11 PROCEDURE — 700111 HCHG RX REV CODE 636 W/ 250 OVERRIDE (IP): Performed by: SURGERY

## 2021-12-11 PROCEDURE — 36415 COLL VENOUS BLD VENIPUNCTURE: CPT

## 2021-12-11 PROCEDURE — 0FT40ZZ RESECTION OF GALLBLADDER, OPEN APPROACH: ICD-10-PCS | Performed by: INTERNAL MEDICINE

## 2021-12-11 PROCEDURE — A9270 NON-COVERED ITEM OR SERVICE: HCPCS | Performed by: SURGERY

## 2021-12-11 PROCEDURE — 80053 COMPREHEN METABOLIC PANEL: CPT

## 2021-12-11 PROCEDURE — 700102 HCHG RX REV CODE 250 W/ 637 OVERRIDE(OP): Performed by: SURGERY

## 2021-12-11 PROCEDURE — 99024 POSTOP FOLLOW-UP VISIT: CPT | Performed by: SURGERY

## 2021-12-11 PROCEDURE — 85025 COMPLETE CBC W/AUTO DIFF WBC: CPT

## 2021-12-11 PROCEDURE — 700105 HCHG RX REV CODE 258: Performed by: SURGERY

## 2021-12-11 PROCEDURE — 770001 HCHG ROOM/CARE - MED/SURG/GYN PRIV*

## 2021-12-11 PROCEDURE — 0FB44ZZ EXCISION OF GALLBLADDER, PERCUTANEOUS ENDOSCOPIC APPROACH: ICD-10-PCS | Performed by: INTERNAL MEDICINE

## 2021-12-11 RX ADMIN — HYDROCODONE BITARTRATE AND ACETAMINOPHEN 1 TABLET: 5; 325 TABLET ORAL at 09:11

## 2021-12-11 RX ADMIN — ARIPIPRAZOLE 5 MG: 5 TABLET ORAL at 17:24

## 2021-12-11 RX ADMIN — CELECOXIB 200 MG: 200 CAPSULE ORAL at 05:56

## 2021-12-11 RX ADMIN — DOCUSATE SODIUM 100 MG: 100 CAPSULE ORAL at 05:56

## 2021-12-11 RX ADMIN — ATORVASTATIN CALCIUM 20 MG: 20 TABLET, FILM COATED ORAL at 05:56

## 2021-12-11 RX ADMIN — CELECOXIB 200 MG: 200 CAPSULE ORAL at 17:24

## 2021-12-11 RX ADMIN — DOCUSATE SODIUM 50 MG AND SENNOSIDES 8.6 MG 1 TABLET: 8.6; 5 TABLET, FILM COATED ORAL at 21:01

## 2021-12-11 RX ADMIN — DOCUSATE SODIUM 100 MG: 100 CAPSULE ORAL at 17:24

## 2021-12-11 RX ADMIN — ENOXAPARIN SODIUM 40 MG: 40 INJECTION SUBCUTANEOUS at 17:24

## 2021-12-11 RX ADMIN — TRAZODONE HYDROCHLORIDE 100 MG: 100 TABLET ORAL at 21:01

## 2021-12-11 RX ADMIN — LISINOPRIL 10 MG: 10 TABLET ORAL at 05:56

## 2021-12-11 RX ADMIN — OMEPRAZOLE 40 MG: 20 CAPSULE, DELAYED RELEASE ORAL at 06:04

## 2021-12-11 RX ADMIN — SERTRALINE HYDROCHLORIDE 100 MG: 100 TABLET ORAL at 05:56

## 2021-12-11 RX ADMIN — HYDROCODONE BITARTRATE AND ACETAMINOPHEN 2 TABLET: 5; 325 TABLET ORAL at 21:01

## 2021-12-11 RX ADMIN — PIPERACILLIN AND TAZOBACTAM 3.38 G: 3; .375 INJECTION, POWDER, LYOPHILIZED, FOR SOLUTION INTRAVENOUS; PARENTERAL at 05:55

## 2021-12-11 RX ADMIN — HYDROCODONE BITARTRATE AND ACETAMINOPHEN 1 TABLET: 5; 325 TABLET ORAL at 16:16

## 2021-12-11 ASSESSMENT — PAIN DESCRIPTION - PAIN TYPE: TYPE: SURGICAL PAIN

## 2021-12-11 NOTE — PROGRESS NOTES
Pt A&Ox4.   States pain controlled and tolerable, rates 3/10. Declines intervention.   No emesis last night, denies n/v. + bowel sounds, denies flatus, LBM PTA.  Saturating >90% on 1L, weaned from 3L. IS at bedside pt motivated to use.  Pt ambulates SBA with steady gait.  Updated on plan of care. Safety education provided. Bed locked in low. Call light within reach. Rounding in place.

## 2021-12-11 NOTE — PROGRESS NOTES
"    DATE: 12/11/2021    Post Operative Day  1 open cholecystectomy.    INTERVAL EVENTS:  Pain with movement.  Tolerating regular diet without nausea or vomiting.    PHYSICAL EXAMINATION:  Vital Signs: /73   Pulse 62   Temp 36.1 °C (97 °F) (Temporal)   Resp 16   Ht 1.854 m (6' 1\")   Wt 118 kg (261 lb 0.4 oz)   SpO2 97%     Dressings clean and dry.  Drain - 40 cc last 24 hr - serosang    LABORATORY VALUES:   Recent Labs     12/09/21  1759 12/11/21  0459   WBC 8.6 9.7   RBC 4.45 4.01*   HEMOGLOBIN 13.7 12.3   HEMATOCRIT 41.1 38.2   MCV 92.4 95.3   MCH 30.8 30.7   MCHC 33.3* 32.2*   RDW 43.1 43.7   PLATELETCT 396 327   MPV 9.0 9.1     Recent Labs     12/09/21  1759 12/11/21  0722   SODIUM 138 138   POTASSIUM 4.0 4.1   CHLORIDE 104 103   CO2 22 28   GLUCOSE 204* 157*   BUN 10 8   CREATININE 0.63 0.61   CALCIUM 9.3 8.8     Recent Labs     12/09/21  1759 12/11/21  0722   ASTSGOT 25 99*   ALTSGPT 38 184*   TBILIRUBIN 0.2 0.4   ALKPHOSPHAT 146* 146*   GLOBULIN 3.3 2.3            IMAGING:   US-RUQ   Final Result      1.  Marked gallbladder wall thickening and reportedly tenderness over the gallbladder without evidence of gallstone suggesting acalculous cholecystitis.      2.  Echogenic liver suggesting hepatic steatosis.          ASSESSMENT AND PLAN:     * Cholecystitis, acute- (present on admission)  Assessment & Plan  RUQ pain  U/S shows: Marked gallbladder wall thickening and reportedly tenderness over the gallbladder without evidence of gallstone suggesting acalculous cholecystitis.   Echogenic liver suggesting hepatic steatosis.  12/10 to OR fro lap khanh / possible open khanh    Obesity (BMI 30.0-34.9)- (present on admission)  Assessment & Plan  12/10  BMI 34.44    Increase mobility with ambulation in the hallway  Repeat labs tomorrow morning  Patient be completing IV antibiotics -should be able to Hep-Lock IV       ____________________________________     Ernie Valiente M.D.    DD: 12/11/2021  12:04 " PM

## 2021-12-11 NOTE — OP REPORT
DATE OF SERVICE:  12/10/2021     PREOPERATIVE DIAGNOSIS:  Acute cholecystitis, acalculous by ultrasound.     POSTOPERATIVE DIAGNOSIS:  Acute cholecystitis, acalculous by ultrasound.     PROCEDURE PERFORMED:  Attempted laparoscopic cholecystectomy converted to an   open cholecystectomy.     SURGEON:  Ernie Valiente MD     ASSISTANT:  Jillian Arauz MD     ANESTHESIOLOGIST:  Jasbir Omer MD     ESTIMATED BLOOD LOSS:  Probably 100 mL.     FINDINGS:  Marked inflammation throughout, thickened gallbladder wall with   dense omental adhesions.  The duodenum was adhered medially as well as   laterally.  The gallbladder itself was markedly thickened and abnormal   appearance.  The specimen was sent to pathology for frozen section to evaluate   for possible carcinoma.     WOUND CLASS:  Contaminated.     DRAINS:  10 mm flat drain right side subhepatic.     INDICATIONS:  The patient is a 51-year-old female who has had several months   of intermittent right upper quadrant pain.  This is the worst episode she has   had. It is definitely made worse by certain foods.  She has been evaluated for   this in the past.  Ultrasound showed no evidence of stones.  CAT scan has   shown significant inflammation.  Her ultrasound today shows marked thickening   of the gallbladder wall and she is tender in the right subcostal region.  She   has a normal white count, normal liver function tests.     DESCRIPTION OF PROCEDURE:  With the patient in supine position, general   endotracheal anesthesia was administered.  Timeout was called.  Abdomen was   prepped and widely draped.  Local anesthesia was infiltrated above the   umbilicus.  Transverse incision was made.  Blunt dissection was used to the   level of fascia.  Fascia was cleaned, it was opened sharply in the midline   with single pass of an 11 blade.  Clamps inserted and spread opening the   remainder of the peritoneum.  A 0 Vicryl sutures placed in figure-of-eight   fashion, was  not tied.  A 5 mm port was inserted through this opening without   the use of a trocar.  The trocar was secured with a suture and the abdomen was   insufflated.  A 12 mm port was placed in the epigastric position under direct   vision after instillation of local anesthesia. Two 5 mm ports were placed in   the right subcostal area under direct vision in the same fashion.     At this time, there was significant inflammation below the right lobe of the   liver, was not able to identify a gallbladder initially. Retractor was used to   grasp the omentum, was adhered along the inferior edge of the liver.    Combination of cautery and LigaSure was used to take the omentum down.   Beginning lower lung, finally I was able to identify the gallbladder which was   quite thickened, difficult to grasp, and with further suction and blunt   dissection I was able to identify duodenum adherent medially as well as   laterally.  In manipulating the gallbladder, the whole anterior wall of the   gallbladder essentially fell apart exposing the anterior, which was markedly   abnormal. At this time, the specimen was sent to pathology for frozen section.   Given the amount of inflammation and adherence to the surrounding tissues and   the abnormalities, I was concerned that may have been a carcinoma.  Frozen   section revealed no evidence of abnormal cells, just marked scarring,   thickening and inflammation.  At this time, I was not able to make any further   progress laparoscopically as I was not able to do any traction as the whole   surface of the gallbladder had essentially fallen off. At this time, I   converted to an open procedure.  The port sites were removed.  The suture at   the previous umbilical site was tied and cut.  The equipment was changed out,   counts were done.  All counts were correct. Counts of new equipment done.  A   subcostal incision was made incorporating one of the previous ports in order   to stay below the  costal margin.  Cautery was used to subcutaneous tissues   until identifying the fascia, it was cleaned, the anterior rectus sheath was   opened, the rectus abdominis muscle was identified and divided extending   laterally through the external and internal obliques.  Posterior rectus sheath   and transverse abdominis muscles were identified and divided, again extending   laterally.  The peritoneum was opened.  It was opened extending medially as   well as laterally.  A David retractor was placed using long blades. At this   time, we were able to identify the remnant of the gallbladder.  There was   marked adherence medially and laterally.  Finger was inserted between the   liver and the duodenum.  I was just able to easily separate these just with   gentle pressure medial and lateral to the gallbladder.  The gallbladder itself   was still markedly thickened and abnormal. Gallbladder anterior and lateral   areas were taken down off the liver with cautery.  Finally, this came down in   pieces, did come across a small bleeding arterial, potentially cystic artery.    This is oversewn with a 3-0 silk suture with cessation of bleeding.  The   final piece of the area in the infundibulum was dissected with cautery,   staying away from the ____ structures.  I was never able to identify a cystic   duct, was never able to see any stones or evidence of bile. The residual   mucosa along the gallbladder was cauterized.  There was some bleeding from the   liver itself as it was quite fatty in appearance and loose in the areas of   adherent omentum.  This was treated with Hemoblast after irrigation. A 10 flat   drain was brought in from the lateral port site, it was trimmed in length.    Once the laps were out, there was no further evidence of any bleeding.  The   drain was in good position and was secured to the skin with a 3-0 silk suture.    The posterior rectus sheath was reapproximated with a running #1 Vicryl   sutures.   The anterior rectus sheath and external oblique were reapproximated   with another running suture from medial to lateral.  Subcutaneous tissues were   irrigated.  Skin at all incisions and port sites was reapproximated with   staples.  Area was cleaned and dried, dressings were placed.  The patient   tolerated the procedure well.  She was extubated in the OR and brought to   recovery room.  Plan to be admitted to the floor.        ______________________________  MD BLANCA CARMICHAEL/ERIC/WADE    DD:  12/10/2021 16:34  DT:  12/10/2021 18:13    Job#:  [id number]

## 2021-12-11 NOTE — PROGRESS NOTES
Bedside report received.  Assessment complete.  A&O x 4. Patient calls appropriately.  Patient ambulates with standby assist. Bed alarm off.   Patient has 8/10 pain. Medicated per MAR.   Skin per flow sheet.  Tolerating clear liquid diet. Denies N/V.  + void, - BM. Last BM PTA.  SCD's on.  Reviewed plan of care with patient. Call light and personal belongings within reach. Hourly rounding in place. All needs met at this time.

## 2021-12-11 NOTE — CARE PLAN
The patient is Stable - Low risk of patient condition declining or worsening    Shift Goals  Clinical Goals: Advance diet, ambulate, decrease supplemental O2  Patient Goals: Walk, tolerate diet, use IS    Progress made toward(s) clinical / shift goals:  Tolerating PO intake today with no nausea or emesis. Pain well controlled with PO intervention.    Problem: Pain - Standard  Goal: Alleviation of pain or a reduction in pain to the patient’s comfort goal  Outcome: Progressing     Problem: Knowledge Deficit - Standard  Goal: Patient and family/care givers will demonstrate understanding of plan of care, disease process/condition, diagnostic tests and medications  Outcome: Progressing       Patient is not progressing towards the following goals:

## 2021-12-11 NOTE — CARE PLAN
Problem: Pain - Standard  Goal: Alleviation of pain or a reduction in pain to the patient’s comfort goal  Outcome: Progressing     Problem: Knowledge Deficit - Standard  Goal: Patient and family/care givers will demonstrate understanding of plan of care, disease process/condition, diagnostic tests and medications  Outcome: Progressing       The patient is Stable - Low risk of patient condition declining or worsening    Shift Goals  Clinical Goals: tolerate diet, pain/nausea control  Patient Goals: Pain control    Progress made toward(s) clinical / shift goals: Pt pain controlled with prn medication per MAR. POC discussed with pt. Call light within reach and pt calls appropriately.

## 2021-12-12 LAB
ALBUMIN SERPL BCP-MCNC: 3.7 G/DL (ref 3.2–4.9)
ALBUMIN/GLOB SERPL: 1.5 G/DL
ALP SERPL-CCNC: 142 U/L (ref 30–99)
ALT SERPL-CCNC: 122 U/L (ref 2–50)
ANION GAP SERPL CALC-SCNC: 8 MMOL/L (ref 7–16)
AST SERPL-CCNC: 41 U/L (ref 12–45)
BASOPHILS # BLD AUTO: 0.6 % (ref 0–1.8)
BASOPHILS # BLD: 0.06 K/UL (ref 0–0.12)
BILIRUB SERPL-MCNC: 0.5 MG/DL (ref 0.1–1.5)
BUN SERPL-MCNC: 11 MG/DL (ref 8–22)
CALCIUM SERPL-MCNC: 8.9 MG/DL (ref 8.5–10.5)
CHLORIDE SERPL-SCNC: 101 MMOL/L (ref 96–112)
CO2 SERPL-SCNC: 30 MMOL/L (ref 20–33)
CREAT SERPL-MCNC: 0.65 MG/DL (ref 0.5–1.4)
EOSINOPHIL # BLD AUTO: 0.14 K/UL (ref 0–0.51)
EOSINOPHIL NFR BLD: 1.4 % (ref 0–6.9)
ERYTHROCYTE [DISTWIDTH] IN BLOOD BY AUTOMATED COUNT: 46.5 FL (ref 35.9–50)
GLOBULIN SER CALC-MCNC: 2.4 G/DL (ref 1.9–3.5)
GLUCOSE SERPL-MCNC: 117 MG/DL (ref 65–99)
HCT VFR BLD AUTO: 37.4 % (ref 37–47)
HGB BLD-MCNC: 11.8 G/DL (ref 12–16)
IMM GRANULOCYTES # BLD AUTO: 0.04 K/UL (ref 0–0.11)
IMM GRANULOCYTES NFR BLD AUTO: 0.4 % (ref 0–0.9)
LYMPHOCYTES # BLD AUTO: 1.73 K/UL (ref 1–4.8)
LYMPHOCYTES NFR BLD: 17.9 % (ref 22–41)
MCH RBC QN AUTO: 30.7 PG (ref 27–33)
MCHC RBC AUTO-ENTMCNC: 31.6 G/DL (ref 33.6–35)
MCV RBC AUTO: 97.4 FL (ref 81.4–97.8)
MONOCYTES # BLD AUTO: 0.7 K/UL (ref 0–0.85)
MONOCYTES NFR BLD AUTO: 7.2 % (ref 0–13.4)
NEUTROPHILS # BLD AUTO: 7.02 K/UL (ref 2–7.15)
NEUTROPHILS NFR BLD: 72.5 % (ref 44–72)
NRBC # BLD AUTO: 0 K/UL
NRBC BLD-RTO: 0 /100 WBC
PLATELET # BLD AUTO: 300 K/UL (ref 164–446)
PMV BLD AUTO: 8.8 FL (ref 9–12.9)
POTASSIUM SERPL-SCNC: 4 MMOL/L (ref 3.6–5.5)
PROT SERPL-MCNC: 6.1 G/DL (ref 6–8.2)
RBC # BLD AUTO: 3.84 M/UL (ref 4.2–5.4)
SODIUM SERPL-SCNC: 139 MMOL/L (ref 135–145)
WBC # BLD AUTO: 9.7 K/UL (ref 4.8–10.8)

## 2021-12-12 PROCEDURE — A9270 NON-COVERED ITEM OR SERVICE: HCPCS | Performed by: SURGERY

## 2021-12-12 PROCEDURE — 36415 COLL VENOUS BLD VENIPUNCTURE: CPT

## 2021-12-12 PROCEDURE — 99024 POSTOP FOLLOW-UP VISIT: CPT | Performed by: SURGERY

## 2021-12-12 PROCEDURE — 80053 COMPREHEN METABOLIC PANEL: CPT

## 2021-12-12 PROCEDURE — 770001 HCHG ROOM/CARE - MED/SURG/GYN PRIV*

## 2021-12-12 PROCEDURE — 700102 HCHG RX REV CODE 250 W/ 637 OVERRIDE(OP): Performed by: SURGERY

## 2021-12-12 PROCEDURE — 700111 HCHG RX REV CODE 636 W/ 250 OVERRIDE (IP): Performed by: SURGERY

## 2021-12-12 PROCEDURE — 85025 COMPLETE CBC W/AUTO DIFF WBC: CPT

## 2021-12-12 RX ADMIN — DOCUSATE SODIUM 100 MG: 100 CAPSULE ORAL at 04:30

## 2021-12-12 RX ADMIN — LISINOPRIL 10 MG: 10 TABLET ORAL at 04:35

## 2021-12-12 RX ADMIN — HYDROCODONE BITARTRATE AND ACETAMINOPHEN 1 TABLET: 5; 325 TABLET ORAL at 13:37

## 2021-12-12 RX ADMIN — CELECOXIB 200 MG: 200 CAPSULE ORAL at 04:30

## 2021-12-12 RX ADMIN — SERTRALINE HYDROCHLORIDE 100 MG: 100 TABLET ORAL at 04:30

## 2021-12-12 RX ADMIN — TRAZODONE HYDROCHLORIDE 100 MG: 100 TABLET ORAL at 21:19

## 2021-12-12 RX ADMIN — DOCUSATE SODIUM 100 MG: 100 CAPSULE ORAL at 17:52

## 2021-12-12 RX ADMIN — CELECOXIB 200 MG: 200 CAPSULE ORAL at 17:52

## 2021-12-12 RX ADMIN — ATORVASTATIN CALCIUM 20 MG: 20 TABLET, FILM COATED ORAL at 04:30

## 2021-12-12 RX ADMIN — HYDROCODONE BITARTRATE AND ACETAMINOPHEN 1 TABLET: 5; 325 TABLET ORAL at 08:16

## 2021-12-12 RX ADMIN — POLYETHYLENE GLYCOL 3350 1 PACKET: 17 POWDER, FOR SOLUTION ORAL at 17:52

## 2021-12-12 RX ADMIN — HYDROCODONE BITARTRATE AND ACETAMINOPHEN 1 TABLET: 5; 325 TABLET ORAL at 21:19

## 2021-12-12 RX ADMIN — OMEPRAZOLE 40 MG: 20 CAPSULE, DELAYED RELEASE ORAL at 04:30

## 2021-12-12 RX ADMIN — DOCUSATE SODIUM 50 MG AND SENNOSIDES 8.6 MG 1 TABLET: 8.6; 5 TABLET, FILM COATED ORAL at 21:19

## 2021-12-12 RX ADMIN — ENOXAPARIN SODIUM 40 MG: 40 INJECTION SUBCUTANEOUS at 17:52

## 2021-12-12 RX ADMIN — ARIPIPRAZOLE 5 MG: 5 TABLET ORAL at 17:52

## 2021-12-12 ASSESSMENT — PAIN DESCRIPTION - PAIN TYPE
TYPE: SURGICAL PAIN
TYPE: SURGICAL PAIN

## 2021-12-12 NOTE — PROGRESS NOTES
"    DATE: 12/12/2021    Post Operative Day  2 open cholecystectomy.    INTERVAL EVENTS:    Feels very tired today  Pain with movement.  Still requiring a fair amount of pain meds  Tolerating regular diet without nausea or vomiting    PHYSICAL EXAMINATION:  Vital Signs: /67   Pulse 76   Temp 36.4 °C (97.5 °F) (Temporal)   Resp 16   Ht 1.854 m (6' 1\")   Wt 118 kg (261 lb 0.4 oz)   SpO2 91%     Dressings clean and dry.  Drain - 35 cc last 12 hr - serosang    LABORATORY VALUES:   Recent Labs     12/09/21  1759 12/11/21  0459 12/12/21  0759   WBC 8.6 9.7 9.7   RBC 4.45 4.01* 3.84*   HEMOGLOBIN 13.7 12.3 11.8*   HEMATOCRIT 41.1 38.2 37.4   MCV 92.4 95.3 97.4   MCH 30.8 30.7 30.7   MCHC 33.3* 32.2* 31.6*   RDW 43.1 43.7 46.5   PLATELETCT 396 327 300   MPV 9.0 9.1 8.8*     Recent Labs     12/09/21 1759 12/11/21  0722 12/12/21  0759   SODIUM 138 138 139   POTASSIUM 4.0 4.1 4.0   CHLORIDE 104 103 101   CO2 22 28 30   GLUCOSE 204* 157* 117*   BUN 10 8 11   CREATININE 0.63 0.61 0.65   CALCIUM 9.3 8.8 8.9     Recent Labs     12/09/21 1759 12/11/21  0722 12/12/21  0759   ASTSGOT 25 99* 41   ALTSGPT 38 184* 122*   TBILIRUBIN 0.2 0.4 0.5   ALKPHOSPHAT 146* 146* 142*   GLOBULIN 3.3 2.3 2.4            IMAGING:   US-RUQ   Final Result      1.  Marked gallbladder wall thickening and reportedly tenderness over the gallbladder without evidence of gallstone suggesting acalculous cholecystitis.      2.  Echogenic liver suggesting hepatic steatosis.          ASSESSMENT AND PLAN:     * Cholecystitis, acute- (present on admission)  Assessment & Plan  RUQ pain  U/S shows: Marked gallbladder wall thickening and reportedly tenderness over the gallbladder without evidence of gallstone suggesting acalculous cholecystitis.   Echogenic liver suggesting hepatic steatosis.  12/10 to OR for lap khanh converted to open khanh    Obesity (BMI 30.0-34.9)- (present on admission)  Assessment & Plan  12/10  BMI 34.44    Check am labs  Anticipate " will be ready for discharge tomorrow.       ____________________________________     Ernie Valiente M.D.    DD: 12/12/2021  8:22 AM

## 2021-12-12 NOTE — CARE PLAN
Problem: Pain - Standard  Goal: Alleviation of pain or a reduction in pain to the patient’s comfort goal  Outcome: Progressing     Problem: Knowledge Deficit - Standard  Goal: Patient and family/care givers will demonstrate understanding of plan of care, disease process/condition, diagnostic tests and medications  Outcome: Progressing       The patient is Stable - Low risk of patient condition declining or worsening    Shift Goals  Clinical Goals: wean off O2, pain control  Patient Goals: Walk, tolerate diet, use IS    Progress made toward(s) clinical / shift goals: POC disucussed with pt. Pain controlled with prn medication per MAR. Pt has call light within reach and calls appropriately for needs.

## 2021-12-12 NOTE — PROGRESS NOTES
Bedside report received.  Assessment complete.  A&O x 4. Patient calls appropriately.  Patient ambulates with standby assist. Bed alarm off.   Patient has 9/10 pain. Medicated per MAR.   Skin per flow sheet.  Tolerating clear liquid diet. Denies N/V.  + void, + flatus, - BM. Last BM PTA.  SCD's on.  Reviewed plan of care with patient. Call light and personal belongings within reach. Hourly rounding in place. All needs met at this time.

## 2021-12-12 NOTE — PROGRESS NOTES
Received report of patient at start of shift. Patient is AOx4, PRN Norco and ice pack given for complaints of pain. Assessment complete, patient on room air. Right abdomen JORGE LUIS drain with serosanguinous output. Patient updated on plan of care, encouraged to use call light for any needs/assistance. Safety education provided.

## 2021-12-12 NOTE — CARE PLAN
The patient is Stable - Low risk of patient condition declining or worsening    Progress made toward(s) clinical / shift goals:      Problem: Pain - Standard  Goal: Alleviation of pain or a reduction in pain to the patient’s comfort goal  Outcome: Progressing  Note: Pain level frequently assessed. PRN pain medications administered per MAR. Patient reports reduction in pain level post medication administration.     Problem: Knowledge Deficit - Standard  Goal: Patient and family/care givers will demonstrate understanding of plan of care, disease process/condition, diagnostic tests and medications  Outcome: Progressing  Note: Plan of care discussed with patient, planning for possible discharge tomorrow. All questions addressed.

## 2021-12-13 LAB
ALBUMIN SERPL BCP-MCNC: 3.3 G/DL (ref 3.2–4.9)
ALBUMIN/GLOB SERPL: 1.3 G/DL
ALP SERPL-CCNC: 163 U/L (ref 30–99)
ALT SERPL-CCNC: 94 U/L (ref 2–50)
ANION GAP SERPL CALC-SCNC: 9 MMOL/L (ref 7–16)
AST SERPL-CCNC: 32 U/L (ref 12–45)
BASOPHILS # BLD AUTO: 0.7 % (ref 0–1.8)
BASOPHILS # BLD: 0.05 K/UL (ref 0–0.12)
BILIRUB SERPL-MCNC: 0.4 MG/DL (ref 0.1–1.5)
BUN SERPL-MCNC: 9 MG/DL (ref 8–22)
CALCIUM SERPL-MCNC: 8.8 MG/DL (ref 8.5–10.5)
CHLORIDE SERPL-SCNC: 102 MMOL/L (ref 96–112)
CO2 SERPL-SCNC: 27 MMOL/L (ref 20–33)
CREAT SERPL-MCNC: 0.55 MG/DL (ref 0.5–1.4)
EOSINOPHIL # BLD AUTO: 0.19 K/UL (ref 0–0.51)
EOSINOPHIL NFR BLD: 2.7 % (ref 0–6.9)
ERYTHROCYTE [DISTWIDTH] IN BLOOD BY AUTOMATED COUNT: 44.9 FL (ref 35.9–50)
GLOBULIN SER CALC-MCNC: 2.5 G/DL (ref 1.9–3.5)
GLUCOSE SERPL-MCNC: 119 MG/DL (ref 65–99)
HCT VFR BLD AUTO: 36.8 % (ref 37–47)
HGB BLD-MCNC: 11.5 G/DL (ref 12–16)
IMM GRANULOCYTES # BLD AUTO: 0.03 K/UL (ref 0–0.11)
IMM GRANULOCYTES NFR BLD AUTO: 0.4 % (ref 0–0.9)
LYMPHOCYTES # BLD AUTO: 2.03 K/UL (ref 1–4.8)
LYMPHOCYTES NFR BLD: 29 % (ref 22–41)
MAGNESIUM SERPL-MCNC: 2 MG/DL (ref 1.5–2.5)
MCH RBC QN AUTO: 30.1 PG (ref 27–33)
MCHC RBC AUTO-ENTMCNC: 31.3 G/DL (ref 33.6–35)
MCV RBC AUTO: 96.3 FL (ref 81.4–97.8)
MONOCYTES # BLD AUTO: 0.52 K/UL (ref 0–0.85)
MONOCYTES NFR BLD AUTO: 7.4 % (ref 0–13.4)
NEUTROPHILS # BLD AUTO: 4.17 K/UL (ref 2–7.15)
NEUTROPHILS NFR BLD: 59.8 % (ref 44–72)
NRBC # BLD AUTO: 0 K/UL
NRBC BLD-RTO: 0 /100 WBC
PHOSPHATE SERPL-MCNC: 4.4 MG/DL (ref 2.5–4.5)
PLATELET # BLD AUTO: 285 K/UL (ref 164–446)
PMV BLD AUTO: 8.9 FL (ref 9–12.9)
POTASSIUM SERPL-SCNC: 4 MMOL/L (ref 3.6–5.5)
PROT SERPL-MCNC: 5.8 G/DL (ref 6–8.2)
RBC # BLD AUTO: 3.82 M/UL (ref 4.2–5.4)
SODIUM SERPL-SCNC: 138 MMOL/L (ref 135–145)
WBC # BLD AUTO: 7 K/UL (ref 4.8–10.8)

## 2021-12-13 PROCEDURE — 700102 HCHG RX REV CODE 250 W/ 637 OVERRIDE(OP): Performed by: SURGERY

## 2021-12-13 PROCEDURE — A9270 NON-COVERED ITEM OR SERVICE: HCPCS | Performed by: SURGERY

## 2021-12-13 PROCEDURE — 700111 HCHG RX REV CODE 636 W/ 250 OVERRIDE (IP): Performed by: SURGERY

## 2021-12-13 PROCEDURE — 80053 COMPREHEN METABOLIC PANEL: CPT

## 2021-12-13 PROCEDURE — 770001 HCHG ROOM/CARE - MED/SURG/GYN PRIV*

## 2021-12-13 PROCEDURE — 83735 ASSAY OF MAGNESIUM: CPT

## 2021-12-13 PROCEDURE — 85025 COMPLETE CBC W/AUTO DIFF WBC: CPT

## 2021-12-13 PROCEDURE — 36415 COLL VENOUS BLD VENIPUNCTURE: CPT

## 2021-12-13 PROCEDURE — 84100 ASSAY OF PHOSPHORUS: CPT

## 2021-12-13 PROCEDURE — 99024 POSTOP FOLLOW-UP VISIT: CPT | Performed by: NURSE PRACTITIONER

## 2021-12-13 RX ADMIN — OMEPRAZOLE 40 MG: 20 CAPSULE, DELAYED RELEASE ORAL at 04:52

## 2021-12-13 RX ADMIN — ENOXAPARIN SODIUM 40 MG: 40 INJECTION SUBCUTANEOUS at 18:03

## 2021-12-13 RX ADMIN — DOCUSATE SODIUM 50 MG AND SENNOSIDES 8.6 MG 1 TABLET: 8.6; 5 TABLET, FILM COATED ORAL at 20:35

## 2021-12-13 RX ADMIN — HYDROCODONE BITARTRATE AND ACETAMINOPHEN 1 TABLET: 5; 325 TABLET ORAL at 18:03

## 2021-12-13 RX ADMIN — ATORVASTATIN CALCIUM 20 MG: 20 TABLET, FILM COATED ORAL at 04:52

## 2021-12-13 RX ADMIN — CELECOXIB 200 MG: 200 CAPSULE ORAL at 04:52

## 2021-12-13 RX ADMIN — TRAZODONE HYDROCHLORIDE 100 MG: 100 TABLET ORAL at 20:35

## 2021-12-13 RX ADMIN — POLYETHYLENE GLYCOL 3350 1 PACKET: 17 POWDER, FOR SOLUTION ORAL at 18:03

## 2021-12-13 RX ADMIN — DOCUSATE SODIUM 100 MG: 100 CAPSULE ORAL at 04:52

## 2021-12-13 RX ADMIN — CELECOXIB 200 MG: 200 CAPSULE ORAL at 18:03

## 2021-12-13 RX ADMIN — DOCUSATE SODIUM 100 MG: 100 CAPSULE ORAL at 18:03

## 2021-12-13 RX ADMIN — SERTRALINE HYDROCHLORIDE 100 MG: 100 TABLET ORAL at 04:52

## 2021-12-13 RX ADMIN — ARIPIPRAZOLE 5 MG: 5 TABLET ORAL at 18:09

## 2021-12-13 RX ADMIN — LISINOPRIL 10 MG: 10 TABLET ORAL at 04:52

## 2021-12-13 RX ADMIN — HYDROCODONE BITARTRATE AND ACETAMINOPHEN 1 TABLET: 5; 325 TABLET ORAL at 13:00

## 2021-12-13 ASSESSMENT — PAIN DESCRIPTION - PAIN TYPE: TYPE: SURGICAL PAIN

## 2021-12-13 NOTE — CARE PLAN
Problem: Pain - Standard  Goal: Alleviation of pain or a reduction in pain to the patient’s comfort goal  Outcome: Progressing     Problem: Knowledge Deficit - Standard  Goal: Patient and family/care givers will demonstrate understanding of plan of care, disease process/condition, diagnostic tests and medications  Outcome: Progressing       The patient is Stable - Low risk of patient condition declining or worsening    Shift Goals  Clinical Goals: wean O2, pain control  Patient Goals: Walk, tolerate diet, use IS    Progress made toward(s) clinical / shift goals: POC discussed with pt. Pt prepared for likely discharge tomorrow. Pt pain controlled with prn medication.

## 2021-12-13 NOTE — CARE PLAN
The patient is Stable - Low risk of patient condition declining or worsening    Progress made toward(s) clinical / shift goals:      Problem: Pain - Standard  Goal: Alleviation of pain or a reduction in pain to the patient’s comfort goal  Outcome: Progressing  Note: Pain level frequently assessed. PRN Norco administered per MAR.      Problem: Knowledge Deficit - Standard  Goal: Patient and family/care givers will demonstrate understanding of plan of care, disease process/condition, diagnostic tests and medications  Outcome: Progressing  Note: Plan of care discussed with patient. All questions addressed.

## 2021-12-13 NOTE — PROGRESS NOTES
Received report of patient at start of shift. Patient is AOx4, PRN Norco administered for complaints of pain. Assessment complete, patient on 0.5L O2 via NC. RLQ JORGE LUIS drain removed per provider order. Patient updated on plan of care, encouraged to use call light for any needs/assistance. Safety education provided.

## 2021-12-13 NOTE — PROGRESS NOTES
Bedside report received.  Assessment complete.  A&Ox4. Patient calls appropriately.  Patient ambulates by self. Bed alarm off.   Patient has 4/10 pain. Declines intervention at this time but is requesting to take a Norco before bed.  Skin per flow sheet.  Tolerating regular diet. Denies N/V.  + void, + flatus, - BM. Last BM PTA.  SCD's on.  Reviewed plan of care with patient. Call light and personal belongings within reach. Hourly rounding in place. All needs met at this time

## 2021-12-14 LAB
ALBUMIN SERPL BCP-MCNC: 3.3 G/DL (ref 3.2–4.9)
ALBUMIN/GLOB SERPL: 1 G/DL
ALP SERPL-CCNC: 174 U/L (ref 30–99)
ALT SERPL-CCNC: 72 U/L (ref 2–50)
ANION GAP SERPL CALC-SCNC: 10 MMOL/L (ref 7–16)
AST SERPL-CCNC: 17 U/L (ref 12–45)
BASOPHILS # BLD AUTO: 0.9 % (ref 0–1.8)
BASOPHILS # BLD: 0.06 K/UL (ref 0–0.12)
BILIRUB SERPL-MCNC: 0.4 MG/DL (ref 0.1–1.5)
BUN SERPL-MCNC: 10 MG/DL (ref 8–22)
CALCIUM SERPL-MCNC: 9.1 MG/DL (ref 8.5–10.5)
CHLORIDE SERPL-SCNC: 101 MMOL/L (ref 96–112)
CO2 SERPL-SCNC: 29 MMOL/L (ref 20–33)
CREAT SERPL-MCNC: 0.6 MG/DL (ref 0.5–1.4)
EOSINOPHIL # BLD AUTO: 0.28 K/UL (ref 0–0.51)
EOSINOPHIL NFR BLD: 4.1 % (ref 0–6.9)
ERYTHROCYTE [DISTWIDTH] IN BLOOD BY AUTOMATED COUNT: 43.2 FL (ref 35.9–50)
GLOBULIN SER CALC-MCNC: 3.2 G/DL (ref 1.9–3.5)
GLUCOSE SERPL-MCNC: 113 MG/DL (ref 65–99)
HCT VFR BLD AUTO: 36.5 % (ref 37–47)
HGB BLD-MCNC: 12.1 G/DL (ref 12–16)
IMM GRANULOCYTES # BLD AUTO: 0.02 K/UL (ref 0–0.11)
IMM GRANULOCYTES NFR BLD AUTO: 0.3 % (ref 0–0.9)
LYMPHOCYTES # BLD AUTO: 2 K/UL (ref 1–4.8)
LYMPHOCYTES NFR BLD: 29 % (ref 22–41)
MCH RBC QN AUTO: 31.1 PG (ref 27–33)
MCHC RBC AUTO-ENTMCNC: 33.2 G/DL (ref 33.6–35)
MCV RBC AUTO: 93.8 FL (ref 81.4–97.8)
MONOCYTES # BLD AUTO: 0.4 K/UL (ref 0–0.85)
MONOCYTES NFR BLD AUTO: 5.8 % (ref 0–13.4)
NEUTROPHILS # BLD AUTO: 4.14 K/UL (ref 2–7.15)
NEUTROPHILS NFR BLD: 59.9 % (ref 44–72)
NRBC # BLD AUTO: 0 K/UL
NRBC BLD-RTO: 0 /100 WBC
PLATELET # BLD AUTO: 321 K/UL (ref 164–446)
PMV BLD AUTO: 8.9 FL (ref 9–12.9)
POTASSIUM SERPL-SCNC: 4 MMOL/L (ref 3.6–5.5)
PROT SERPL-MCNC: 6.5 G/DL (ref 6–8.2)
RBC # BLD AUTO: 3.89 M/UL (ref 4.2–5.4)
SODIUM SERPL-SCNC: 140 MMOL/L (ref 135–145)
WBC # BLD AUTO: 6.9 K/UL (ref 4.8–10.8)

## 2021-12-14 PROCEDURE — 85025 COMPLETE CBC W/AUTO DIFF WBC: CPT

## 2021-12-14 PROCEDURE — 80053 COMPREHEN METABOLIC PANEL: CPT

## 2021-12-14 PROCEDURE — 700102 HCHG RX REV CODE 250 W/ 637 OVERRIDE(OP): Performed by: SURGERY

## 2021-12-14 PROCEDURE — A9270 NON-COVERED ITEM OR SERVICE: HCPCS | Performed by: SURGERY

## 2021-12-14 PROCEDURE — 770001 HCHG ROOM/CARE - MED/SURG/GYN PRIV*

## 2021-12-14 PROCEDURE — 700102 HCHG RX REV CODE 250 W/ 637 OVERRIDE(OP): Performed by: NURSE PRACTITIONER

## 2021-12-14 PROCEDURE — A9270 NON-COVERED ITEM OR SERVICE: HCPCS | Performed by: NURSE PRACTITIONER

## 2021-12-14 PROCEDURE — 700111 HCHG RX REV CODE 636 W/ 250 OVERRIDE (IP): Performed by: SURGERY

## 2021-12-14 PROCEDURE — 99024 POSTOP FOLLOW-UP VISIT: CPT | Performed by: SURGERY

## 2021-12-14 PROCEDURE — 36415 COLL VENOUS BLD VENIPUNCTURE: CPT

## 2021-12-14 RX ADMIN — ENOXAPARIN SODIUM 40 MG: 40 INJECTION SUBCUTANEOUS at 17:29

## 2021-12-14 RX ADMIN — DOCUSATE SODIUM 50 MG AND SENNOSIDES 8.6 MG 1 TABLET: 8.6; 5 TABLET, FILM COATED ORAL at 22:21

## 2021-12-14 RX ADMIN — CELECOXIB 200 MG: 200 CAPSULE ORAL at 05:02

## 2021-12-14 RX ADMIN — DOCUSATE SODIUM 100 MG: 100 CAPSULE ORAL at 17:29

## 2021-12-14 RX ADMIN — TRAZODONE HYDROCHLORIDE 100 MG: 100 TABLET ORAL at 22:21

## 2021-12-14 RX ADMIN — HYDROCODONE BITARTRATE AND ACETAMINOPHEN 1 TABLET: 5; 325 TABLET ORAL at 11:35

## 2021-12-14 RX ADMIN — ATORVASTATIN CALCIUM 20 MG: 20 TABLET, FILM COATED ORAL at 05:02

## 2021-12-14 RX ADMIN — CELECOXIB 200 MG: 200 CAPSULE ORAL at 17:29

## 2021-12-14 RX ADMIN — LISINOPRIL 10 MG: 10 TABLET ORAL at 05:02

## 2021-12-14 RX ADMIN — HYDROCODONE BITARTRATE AND ACETAMINOPHEN 2 TABLET: 5; 325 TABLET ORAL at 17:33

## 2021-12-14 RX ADMIN — POLYETHYLENE GLYCOL 3350 1 PACKET: 17 POWDER, FOR SOLUTION ORAL at 05:02

## 2021-12-14 RX ADMIN — OMEPRAZOLE 40 MG: 20 CAPSULE, DELAYED RELEASE ORAL at 05:03

## 2021-12-14 RX ADMIN — HYDROCODONE BITARTRATE AND ACETAMINOPHEN 1 TABLET: 5; 325 TABLET ORAL at 02:25

## 2021-12-14 RX ADMIN — SERTRALINE HYDROCHLORIDE 100 MG: 100 TABLET ORAL at 05:02

## 2021-12-14 RX ADMIN — DOCUSATE SODIUM 100 MG: 100 CAPSULE ORAL at 05:02

## 2021-12-14 RX ADMIN — ARIPIPRAZOLE 5 MG: 5 TABLET ORAL at 17:28

## 2021-12-14 RX ADMIN — MAGNESIUM CITRATE 148 ML: 1.75 LIQUID ORAL at 12:49

## 2021-12-14 ASSESSMENT — PAIN DESCRIPTION - PAIN TYPE
TYPE: ACUTE PAIN;SURGICAL PAIN

## 2021-12-14 NOTE — PROGRESS NOTES
"      DATE: 12/14/2021    Post Operative Day  4 Lap converted to open cholecystectomy.    INTERVAL EVENTS:  Off supplemental 02 this am  No BM    PHYSICAL EXAMINATION:  Vital Signs: Blood Pressure 128/88   Pulse 63   Temperature 36.6 °C (97.9 °F) (Temporal)   Respiration 17   Height 1.854 m (6' 1\")   Weight 118 kg (261 lb 0.4 oz)   Oxygen Saturation 92%     Alert, no acute distress  IS 1000  Respirations even and unlabored   RUQ incision with surgical dressing in place, dressing in place to drain removal site  Abdomen soft, expected tenderness near surgical site      LABORATORY VALUES:   Recent Labs     12/12/21 0759 12/13/21 0410 12/14/21  0339   WBC 9.7 7.0 6.9   RBC 3.84* 3.82* 3.89*   HEMOGLOBIN 11.8* 11.5* 12.1   HEMATOCRIT 37.4 36.8* 36.5*   MCV 97.4 96.3 93.8   MCH 30.7 30.1 31.1   MCHC 31.6* 31.3* 33.2*   RDW 46.5 44.9 43.2   PLATELETCT 300 285 321   MPV 8.8* 8.9* 8.9*     Recent Labs     12/12/21 0759 12/13/21 0410 12/14/21  0339   SODIUM 139 138 140   POTASSIUM 4.0 4.0 4.0   CHLORIDE 101 102 101   CO2 30 27 29   GLUCOSE 117* 119* 113*   BUN 11 9 10   CREATININE 0.65 0.55 0.60   CALCIUM 8.9 8.8 9.1     Recent Labs     12/12/21 0759 12/13/21 0410 12/14/21  0339   ASTSGOT 41 32 17   ALTSGPT 122* 94* 72*   TBILIRUBIN 0.5 0.4 0.4   ALKPHOSPHAT 142* 163* 174*   GLOBULIN 2.4 2.5 3.2            IMAGING:   US-RUQ   Final Result      1.  Marked gallbladder wall thickening and reportedly tenderness over the gallbladder without evidence of gallstone suggesting acalculous cholecystitis.      2.  Echogenic liver suggesting hepatic steatosis.          ASSESSMENT AND PLAN:     * Cholecystitis, acute- (present on admission)  Assessment & Plan  RUQ pain  U/S shows: Marked gallbladder wall thickening and reportedly tenderness over the gallbladder without evidence of gallstone suggesting acalculous cholecystitis.   Echogenic liver suggesting hepatic steatosis.  12/10 to OR for lap khanh converted to open khanh "     Obesity (BMI 30.0-34.9)- (present on admission)  Assessment & Plan  12/10  BMI 34.44      Add magnesium citrate   Mobilize, out of bed for all meals  Continue 02 wean  Anticipate discharge home once able to remain off 02 and is having bowel movements     ____________________________________     JOVAN Bynum    DD: 12/14/2021  10:16 AM

## 2021-12-14 NOTE — PROGRESS NOTES
Bedside report received.  Assessment complete.  A&O x 4. Patient calls appropriately.  Patient ambulates without assist.    Patient has 5/10 pain. Pain managed with prescribed medications.  Denies N&V. Tolerating diet.  Surgical incisions to abdomen are surgically dressed, CDI.  + void, + flatus, - BM.  Patient denies SOB.  Patient pleasant with staff and resting in bed.  Review plan with of care with patient. Call light and personal belongings within reach. Hourly rounding in place. All needs met at this time.

## 2021-12-14 NOTE — PROGRESS NOTES
Bedside report received.  Assessment complete.  A&Ox4. Patient calls appropriately.  Patient ambulates by self. Bed alarm off.   Patient has 7/10 pain. Waiting until next Norco is due in an hour.  JORGE LUIS drain pulled today, dressing CDI.  Tolerating regular diet. Denies N/V.  + void, + flatus, - BM. Last BM PTA.  SCD's on.  Reviewed plan of care with patient. Call light and personal belongings within reach. Hourly rounding in place. All needs met at this time.

## 2021-12-14 NOTE — CARE PLAN
Problem: Pain - Standard  Goal: Alleviation of pain or a reduction in pain to the patient’s comfort goal  Outcome: Progressing     Problem: Knowledge Deficit - Standard  Goal: Patient and family/care givers will demonstrate understanding of plan of care, disease process/condition, diagnostic tests and medications  Outcome: Progressing       The patient is Stable - Low risk of patient condition declining or worsening    Shift Goals  Clinical Goals: wean O2, pain control  Patient Goals: Walk, tolerate diet, use IS    Progress made toward(s) clinical / shift goals: POC dicsussed with pt. Pt has call light within reach and calls appropriately for needs/ questions. Pain managed with prn medication per MAR.

## 2021-12-15 ENCOUNTER — PHARMACY VISIT (OUTPATIENT)
Dept: PHARMACY | Facility: MEDICAL CENTER | Age: 51
End: 2021-12-15
Payer: COMMERCIAL

## 2021-12-15 VITALS
HEIGHT: 72 IN | OXYGEN SATURATION: 92 % | SYSTOLIC BLOOD PRESSURE: 125 MMHG | BODY MASS INDEX: 35.35 KG/M2 | DIASTOLIC BLOOD PRESSURE: 76 MMHG | TEMPERATURE: 97.5 F | WEIGHT: 261.02 LBS | RESPIRATION RATE: 16 BRPM | HEART RATE: 63 BPM

## 2021-12-15 LAB
ALBUMIN SERPL BCP-MCNC: 3.5 G/DL (ref 3.2–4.9)
ALBUMIN/GLOB SERPL: 1 G/DL
ALP SERPL-CCNC: 228 U/L (ref 30–99)
ALT SERPL-CCNC: 64 U/L (ref 2–50)
ANION GAP SERPL CALC-SCNC: 10 MMOL/L (ref 7–16)
AST SERPL-CCNC: 23 U/L (ref 12–45)
BASOPHILS # BLD AUTO: 0.7 % (ref 0–1.8)
BASOPHILS # BLD: 0.05 K/UL (ref 0–0.12)
BILIRUB SERPL-MCNC: 0.3 MG/DL (ref 0.1–1.5)
BUN SERPL-MCNC: 12 MG/DL (ref 8–22)
CALCIUM SERPL-MCNC: 9.2 MG/DL (ref 8.5–10.5)
CHLORIDE SERPL-SCNC: 99 MMOL/L (ref 96–112)
CO2 SERPL-SCNC: 29 MMOL/L (ref 20–33)
CREAT SERPL-MCNC: 0.62 MG/DL (ref 0.5–1.4)
EOSINOPHIL # BLD AUTO: 0.26 K/UL (ref 0–0.51)
EOSINOPHIL NFR BLD: 3.6 % (ref 0–6.9)
ERYTHROCYTE [DISTWIDTH] IN BLOOD BY AUTOMATED COUNT: 43.1 FL (ref 35.9–50)
GLOBULIN SER CALC-MCNC: 3.4 G/DL (ref 1.9–3.5)
GLUCOSE SERPL-MCNC: 128 MG/DL (ref 65–99)
HCT VFR BLD AUTO: 38.8 % (ref 37–47)
HGB BLD-MCNC: 12.6 G/DL (ref 12–16)
IMM GRANULOCYTES # BLD AUTO: 0.02 K/UL (ref 0–0.11)
IMM GRANULOCYTES NFR BLD AUTO: 0.3 % (ref 0–0.9)
LYMPHOCYTES # BLD AUTO: 2.11 K/UL (ref 1–4.8)
LYMPHOCYTES NFR BLD: 29.6 % (ref 22–41)
MCH RBC QN AUTO: 30.5 PG (ref 27–33)
MCHC RBC AUTO-ENTMCNC: 32.5 G/DL (ref 33.6–35)
MCV RBC AUTO: 93.9 FL (ref 81.4–97.8)
MONOCYTES # BLD AUTO: 0.46 K/UL (ref 0–0.85)
MONOCYTES NFR BLD AUTO: 6.5 % (ref 0–13.4)
NEUTROPHILS # BLD AUTO: 4.23 K/UL (ref 2–7.15)
NEUTROPHILS NFR BLD: 59.3 % (ref 44–72)
NRBC # BLD AUTO: 0 K/UL
NRBC BLD-RTO: 0 /100 WBC
PLATELET # BLD AUTO: 376 K/UL (ref 164–446)
PMV BLD AUTO: 9 FL (ref 9–12.9)
POTASSIUM SERPL-SCNC: 4.1 MMOL/L (ref 3.6–5.5)
PROT SERPL-MCNC: 6.9 G/DL (ref 6–8.2)
RBC # BLD AUTO: 4.13 M/UL (ref 4.2–5.4)
SODIUM SERPL-SCNC: 138 MMOL/L (ref 135–145)
WBC # BLD AUTO: 7.1 K/UL (ref 4.8–10.8)

## 2021-12-15 PROCEDURE — 80053 COMPREHEN METABOLIC PANEL: CPT

## 2021-12-15 PROCEDURE — 99024 POSTOP FOLLOW-UP VISIT: CPT | Performed by: SURGERY

## 2021-12-15 PROCEDURE — 85025 COMPLETE CBC W/AUTO DIFF WBC: CPT

## 2021-12-15 PROCEDURE — 700102 HCHG RX REV CODE 250 W/ 637 OVERRIDE(OP): Performed by: SURGERY

## 2021-12-15 PROCEDURE — RXMED WILLOW AMBULATORY MEDICATION CHARGE: Performed by: NURSE PRACTITIONER

## 2021-12-15 PROCEDURE — 36415 COLL VENOUS BLD VENIPUNCTURE: CPT

## 2021-12-15 PROCEDURE — A9270 NON-COVERED ITEM OR SERVICE: HCPCS | Performed by: SURGERY

## 2021-12-15 RX ORDER — HYDROCODONE BITARTRATE AND ACETAMINOPHEN 5; 325 MG/1; MG/1
1-2 TABLET ORAL EVERY 4 HOURS PRN
Qty: 20 TABLET | Refills: 0 | Status: SHIPPED | OUTPATIENT
Start: 2021-12-15 | End: 2021-12-22

## 2021-12-15 RX ORDER — AMOXICILLIN 250 MG
1 CAPSULE ORAL
Qty: 30 TABLET | Refills: 0 | COMMUNITY
Start: 2021-12-15 | End: 2022-06-18

## 2021-12-15 RX ORDER — POLYETHYLENE GLYCOL 3350 17 G/17G
POWDER, FOR SOLUTION ORAL 2 TIMES DAILY PRN
COMMUNITY
Start: 2021-12-15 | End: 2022-06-18

## 2021-12-15 RX ORDER — PSEUDOEPHEDRINE HCL 30 MG
100 TABLET ORAL 2 TIMES DAILY
Qty: 60 CAPSULE | COMMUNITY
Start: 2021-12-15 | End: 2022-06-18

## 2021-12-15 RX ADMIN — HYDROCODONE BITARTRATE AND ACETAMINOPHEN 1 TABLET: 5; 325 TABLET ORAL at 08:26

## 2021-12-15 RX ADMIN — ATORVASTATIN CALCIUM 20 MG: 20 TABLET, FILM COATED ORAL at 05:31

## 2021-12-15 RX ADMIN — CELECOXIB 200 MG: 200 CAPSULE ORAL at 05:31

## 2021-12-15 RX ADMIN — DOCUSATE SODIUM 100 MG: 100 CAPSULE ORAL at 05:31

## 2021-12-15 RX ADMIN — SERTRALINE HYDROCHLORIDE 100 MG: 100 TABLET ORAL at 05:37

## 2021-12-15 RX ADMIN — OMEPRAZOLE 40 MG: 20 CAPSULE, DELAYED RELEASE ORAL at 05:32

## 2021-12-15 RX ADMIN — BISACODYL 10 MG: 10 SUPPOSITORY RECTAL at 05:31

## 2021-12-15 RX ADMIN — LISINOPRIL 10 MG: 10 TABLET ORAL at 05:32

## 2021-12-15 ASSESSMENT — PAIN DESCRIPTION - PAIN TYPE: TYPE: SURGICAL PAIN

## 2021-12-15 NOTE — CARE PLAN
Problem: Pain - Standard  Goal: Alleviation of pain or a reduction in pain to the patient’s comfort goal  Outcome: Progressing     Problem: Knowledge Deficit - Standard  Goal: Patient and family/care givers will demonstrate understanding of plan of care, disease process/condition, diagnostic tests and medications  Outcome: Progressing   The patient is Stable - Low risk of patient condition declining or worsening    Shift Goals  Clinical Goals: Bowel motility  Patient Goals: Rest    Progress made toward(s) clinical / shift goals:  Patient up to date on stool softeners per orders.     Patient is not progressing towards the following goals:

## 2021-12-15 NOTE — DISCHARGE SUMMARY
DATE OF ADMISSION: 12/9/2021    DATE OF DISCHARGE: 12/15/2021    DISCHARGE DIAGNOSIS:  Open cholecystectomy    CONSULTATIONS:  none    PROCEDURES:  On 12/10/2021 Dr. Ernie Valiente performed an attempted laparoscopic cholecystectomy converted to an open cholecystectomy.     BRIEF HPI and HOSPITAL COURSE:  Admitted with above, see admission history and physical. Underwent procedure as above. On day of discharge, the patient has stable vital signs, on room air, tolerating an oral diet, and pain is well controlled with PO meds.  The patient has had a bowel movement. The patient is stable for discharge to home.    DISPOSITION: Discharged home on 12/15/2021. The patient was counseled and questions were answered. Specifically, signs and symptoms of infection, respiratory decompensation, change in condition or worsening condition and persistent or worsening pain were discussed and the patient agrees to seek medical attention if any of these develop.    DISCHARGE MEDICATIONS:  The patients controlled substance history was reviewed and a controlled substance use informed consent (if applicable) was provided by [facility] and the patient has been prescribed.     Medication List      START taking these medications      Instructions   docusate sodium 100 MG Caps   Take 100 mg by mouth 2 times a day.  Dose: 100 mg     HYDROcodone-acetaminophen 5-325 MG Tabs per tablet  Commonly known as: NORCO   Take 1-2 Tablets by mouth every four hours as needed for up to 7 days.  Dose: 1-2 Tablet     magnesium hydroxide 400 MG/5ML Susp  Commonly known as: MILK OF MAGNESIA   Take 30 mL by mouth 1 time a day as needed.  Dose: 30 mL     polyethylene glycol/lytes 17 g Pack  Commonly known as: MIRALAX   Take  by mouth 2 times a day as needed.     senna-docusate 8.6-50 MG Tabs  Commonly known as: PERICOLACE or SENOKOT S   Take 1 Tablet by mouth.  Dose: 1 Tablet        CONTINUE taking these medications       Instructions   ALLERGY PO   Take 1 Tablet by mouth every day.  Dose: 1 Tablet     ARIPiprazole 5 MG tablet  Commonly known as: Abilify   Take 5 mg by mouth every day.  Dose: 5 mg     atorvastatin 20 MG Tabs  Commonly known as: LIPITOR   Take 20 mg by mouth every day.  Dose: 20 mg     Cholecalciferol 2000 UNIT Caps   Take 1 Capsule by mouth every day.  Dose: 1 Capsule     lisinopril 10 MG Tabs  Commonly known as: PRINIVIL   Take 10 mg by mouth every day.  Dose: 10 mg     pantoprazole 40 MG Tbec  Commonly known as: PROTONIX   Take 40 mg by mouth every day.  Dose: 40 mg     sertraline 100 MG Tabs  Commonly known as: Zoloft   Take 100 mg by mouth every day.  Dose: 100 mg     traZODone 100 MG Tabs  Commonly known as: DESYREL   Take 100 mg by mouth every evening.  Dose: 100 mg            ACTIVITY:  As tolerated    WOUND CARE:  Staple removal in approximately 7 to 10 days.  May shower.  No wound submersion.    DIET:  Orders Placed This Encounter   Procedures   • Diet Order Diet: Regular (Low fat, no pork)     Standing Status:   Standing     Number of Occurrences:   1     Order Specific Question:   Diet:     Answer:   Regular [1]     Comments:   Low fat, no pork       FOLLOW UP:  Ernie Valiente M.D.  [address] [place] [id number] [phone]    Schedule an appointment as soon as possible for a visit in 1 week        TIME SPENT ON DISCHARGE: 35 minutes      ____________________________________________  MARGARETTE Claudio    DD: 12/15/2021 9:35 AM

## 2021-12-15 NOTE — PROGRESS NOTES
Patient discharged    IV removed prior to discharge.   Medications from meds to bed delivered to pt  Discharge education provided, all questions answered.   Verbalized understanding of discharge education.   Wheeled out with all personal belongings collected from room.

## 2021-12-15 NOTE — DISCHARGE PLANNING
Meds-to-Beds: Discharge prescription orders listed below delivered to patient's bedside. CALISTA Guerra notified. Patient counseled. Patient elected to have co-payment billed to patient account.      Current Outpatient Medications   Medication Sig Dispense Refill   • HYDROcodone-acetaminophen (NORCO) 5-325 MG Tab per tablet Take 1-2 Tablets by mouth every four hours as needed for up to 7 days. 20 Tablet 0      Philomena Huitron, PharmD

## 2021-12-15 NOTE — DISCHARGE INSTRUCTIONS
Discharge Instructions    Discharged to home by car with relative. Discharged via wheelchair, hospital escort: Yes.  Special equipment needed: Not Applicable    Be sure to schedule a follow-up appointment with your primary care doctor or any specialists as instructed.     Discharge Plan:   Diet Plan: Discussed  Activity Level: Discussed  Confirmed Symptoms Management: Discussed  Medication Reconciliation Updated: Yes  Influenza Vaccine Indication: Patient Refuses    I understand that a diet low in cholesterol, fat, and sodium is recommended for good health. Unless I have been given specific instructions below for another diet, I accept this instruction as my diet prescription.   Other diet: Regular     Special Instructions:   - Call or seek medical attention for questions or concerns  - Follow up with Dr. Valiente in 7-10 days - will need staple removal.  - Follow up with primary care provider within one weeks time  - Resume regular diet  - May take over the counter acetaminophen or ibuprofen as needed for pain  - Continue daily over the counter stool softener while on narcotics  - No operation of machinery or motorized vehicles while under the influence of narcotics  - No alcohol, marijuana or illicit drug use while under the influence of narcotics  - In the event of a narcotic overdose naloxone (Narcan) is available without a prescription from any [facility] or [facility]  - No swimming, hot tubs, baths or wound submersion until cleared by outpatient provider. May shower  - No contact sports, strenuous activities, or heavy lifting until cleared by outpatient provider  - If respiratory decompensation, persistent or worsening pain, change in condition or worsening condition, or signs or symptoms of infection occur seek medical attention      · Is patient discharged on Warfarin / Coumadin?   No     Depression / Suicide Risk    As you are discharged from this [facility] facility, it is important to learn how to  keep safe from harming yourself.    Recognize the warning signs:  · Abrupt changes in personality, positive or negative- including increase in energy   · Giving away possessions  · Change in eating patterns- significant weight changes-  positive or negative  · Change in sleeping patterns- unable to sleep or sleeping all the time   · Unwillingness or inability to communicate  · Depression  · Unusual sadness, discouragement and loneliness  · Talk of wanting to die  · Neglect of personal appearance   · Rebelliousness- reckless behavior  · Withdrawal from people/activities they love  · Confusion- inability to concentrate     If you or a loved one observes any of these behaviors or has concerns about self-harm, here's what you can do:  · Talk about it- your feelings and reasons for harming yourself  · Remove any means that you might use to hurt yourself (examples: pills, rope, extension cords, firearm)  · Get professional help from the community (Mental Health, Substance Abuse, psychological counseling)  · Do not be alone:Call your Safe Contact- someone whom you trust who will be there for you.  · Call your local CRISIS HOTLINE 320-6476 or 970-454-5976  · Call your local Children's Mobile Crisis Response Team [place] (672) 827-9958 or www.[facility]  · Call the toll free National Suicide Prevention Hotlines   · National Suicide Prevention Lifeline 187-989-JCNU (0386)  · National Hope Line Network 800-SUICIDE (782-4589)          Open Cholecystectomy, Care After  This sheet gives you information about how to care for yourself after your procedure. Your health care provider may also give you more specific instructions. If you have problems or questions, contact your health care provider.  What can I expect after the procedure?  After the procedure, it is common to have:  · Pain at your incision site. You will be given medicines to control this pain.  · Mild nausea or vomiting.  Follow these instructions at  home:  Incision care    · Follow instructions from your health care provider about how to take care of your incision. Make sure you:  ? Wash your hands with soap and water before you change your bandage (dressing). If soap and water are not available, use hand .  ? Change your dressing as told by your health care provider.  ? Leave stitches (sutures), skin glue, or adhesive strips in place. These skin closures may need to be in place for 2 weeks or longer. If adhesive strip edges start to loosen and curl up, you may trim the loose edges. Do not remove adhesive strips completely unless your health care provider tells you to do that.  · Do not take baths, swim, or use a hot tub until your health care provider approves. Ask your health care provider if you can take showers. You may only be allowed to take sponge baths for bathing.  · Check your incision area every day for signs of infection. Check for:  ? More redness, swelling, or pain.  ? More fluid or blood.  ? Warmth.  ? Pus or a bad smell.  Activity  · Do not drive or use heavy machinery while taking prescription pain medicine.  · Do not lift anything that is heavier than 10 lb (4.5 kg) until your health care provider approves.  · Do not play contact sports until your health care provider approves.  · Do not drive for 24 hours if you were given a medicine to help you relax (sedative).  · Rest as needed. Do not return to work or school until your health care provider approves.  General instructions  · Take over-the-counter and prescription medicines only as told by your health care provider.  · To prevent or treat constipation while you are taking prescription pain medicine, your health care provider may recommend that you:  ? Drink enough fluid to keep your urine clear or pale yellow.  ? Take over-the-counter or prescription medicines.  ? Eat foods that are high in fiber, such as fresh fruits and vegetables, whole grains, and beans.  ? Limit foods that  are high in fat and processed sugars, such as fried and sweet foods.  Contact a health care provider if:  · You develop a rash.  · You have more redness, swelling, or pain around your incision.  · You have more fluid or blood coming from your incision.  · Your incision feels warm to the touch.  · You have pus or a bad smell coming from your incision.  · You have a fever.  · Your incision breaks open.  Get help right away if:  · You have trouble breathing.  · You have chest pain.  · You have increasing pain in your shoulders.  · You faint or feel dizzy when you stand.  · You have severe pain in your abdomen.  · You have nausea or vomiting that lasts for more than one day.  · You have leg pain.  This information is not intended to replace advice given to you by your health care provider. Make sure you discuss any questions you have with your health care provider.  Document Released: 04/04/2005 Document Revised: 11/30/2018 Document Reviewed: 06/05/2017  Elsevier Patient Education © 2020 [employer]

## 2021-12-15 NOTE — PROGRESS NOTES
POD # 5 Open cholecystectomy    Tolerating diet  Adequate pain control  Room air  (+) BM    A&O x 4  Respiratory rate even and unlabored  Abd soft  Incision and lap sites clean and dry - staples present   Minimal expected tenderness  Mobilizing    Home with RX and instructions  Follow up discussed

## 2021-12-15 NOTE — PROGRESS NOTES
Report received from day shift nurse.   Pt A&Ox4  Tolerating regular diet, denies n/v. Normoactive bowel sounds, passing flatus, LBM PTA.  Saturating >90% on RA.  Pt ambulates independently.  Updated on plan of care. Safety education provided. Bed locked in low. Call light within reach. Rounding in place.

## 2021-12-15 NOTE — PROGRESS NOTES
Report received from RN, assumed care at 070  Pt is A0X4, and responds appropriately   Pt declines any SOB, chest pain, new onset of numbness/ tingiling  Pt rates pain at 5/10, on a scale of 1-10, pt medicated per MAR  Pt is voiding adequatly and without hesitancy  Pt has + flatus, + bowel sounds, + BM on 12/15/2021  Pt ambulates with a steady gait up self   Pt is tolerating a regular  diet, pt denies any nausea/vomiting  Pt has x1 upper abdominal transverse incision, dressing is clean, dry and intact  Pt has x4 lap sites to abdomen, dressings are clean, dry and intact   Pt has old RLQ old JORGE LUIS site, dressing is clean, dry and intact      Plan of care discussed, all questions answered. Explained importance of calling before getting OOB and pt verbalizes understanding. Explained importance of oral care. Call light is within reach, treaded slipper socks on, bed in lowest/ locked position, hourly rounding in place, all needs met at this time

## 2021-12-15 NOTE — CARE PLAN
The patient is Watcher - Medium risk of patient condition declining or worsening    Shift Goals  Clinical Goals: Ambulation, Bowel motility, Pain Management  Patient Goals: Ambulation, Pain Management    Progress made toward(s) clinical / shift goals:  Ambulation, Bowel motility, Pain Management      Problem: Pain - Standard  Goal: Alleviation of pain or a reduction in pain to the patient’s comfort goal  Outcome: Progressing     Problem: Knowledge Deficit - Standard  Goal: Patient and family/care givers will demonstrate understanding of plan of care, disease process/condition, diagnostic tests and medications  Outcome: Progressing

## 2022-06-18 ENCOUNTER — OFFICE VISIT (OUTPATIENT)
Dept: URGENT CARE | Facility: CLINIC | Age: 52
End: 2022-06-18
Payer: MEDICAID

## 2022-06-18 ENCOUNTER — HOSPITAL ENCOUNTER (OUTPATIENT)
Facility: MEDICAL CENTER | Age: 52
End: 2022-06-18
Attending: PHYSICIAN ASSISTANT
Payer: MEDICAID

## 2022-06-18 VITALS
BODY MASS INDEX: 37.19 KG/M2 | OXYGEN SATURATION: 95 % | WEIGHT: 274.6 LBS | SYSTOLIC BLOOD PRESSURE: 126 MMHG | TEMPERATURE: 97.4 F | HEART RATE: 106 BPM | DIASTOLIC BLOOD PRESSURE: 80 MMHG | RESPIRATION RATE: 16 BRPM | HEIGHT: 72 IN

## 2022-06-18 DIAGNOSIS — Z20.822 EXPOSURE TO COVID-19 VIRUS: ICD-10-CM

## 2022-06-18 DIAGNOSIS — Z20.822 SUSPECTED COVID-19 VIRUS INFECTION: ICD-10-CM

## 2022-06-18 PROBLEM — E11.9 DIABETES MELLITUS (HCC): Status: ACTIVE | Noted: 2021-12-20

## 2022-06-18 PROBLEM — E78.5 HYPERLIPIDEMIA: Status: ACTIVE | Noted: 2022-02-02

## 2022-06-18 PROBLEM — I10 HYPERTENSIVE DISORDER: Status: ACTIVE | Noted: 2021-12-20

## 2022-06-18 PROBLEM — F32.A DEPRESSIVE DISORDER: Status: ACTIVE | Noted: 2022-02-02

## 2022-06-18 LAB
EXTERNAL QUALITY CONTROL: NORMAL
SARS-COV+SARS-COV-2 AG RESP QL IA.RAPID: NEGATIVE

## 2022-06-18 PROCEDURE — U0003 INFECTIOUS AGENT DETECTION BY NUCLEIC ACID (DNA OR RNA); SEVERE ACUTE RESPIRATORY SYNDROME CORONAVIRUS 2 (SARS-COV-2) (CORONAVIRUS DISEASE [COVID-19]), AMPLIFIED PROBE TECHNIQUE, MAKING USE OF HIGH THROUGHPUT TECHNOLOGIES AS DESCRIBED BY CMS-2020-01-R: HCPCS

## 2022-06-18 PROCEDURE — 99203 OFFICE O/P NEW LOW 30 MIN: CPT | Mod: CS | Performed by: PHYSICIAN ASSISTANT

## 2022-06-18 PROCEDURE — U0005 INFEC AGEN DETEC AMPLI PROBE: HCPCS

## 2022-06-18 PROCEDURE — 87426 SARSCOV CORONAVIRUS AG IA: CPT | Performed by: PHYSICIAN ASSISTANT

## 2022-06-18 ASSESSMENT — ENCOUNTER SYMPTOMS
SORE THROAT: 1
DIARRHEA: 0
FEVER: 0
CHILLS: 0
HEADACHES: 1
NAUSEA: 0
ABDOMINAL PAIN: 0
VOMITING: 0
DIZZINESS: 0
COUGH: 1

## 2022-06-18 ASSESSMENT — FIBROSIS 4 INDEX: FIB4 SCORE: 0.41

## 2022-06-19 DIAGNOSIS — Z20.822 SUSPECTED COVID-19 VIRUS INFECTION: ICD-10-CM

## 2022-06-19 DIAGNOSIS — Z20.822 EXPOSURE TO COVID-19 VIRUS: ICD-10-CM

## 2022-06-19 LAB
COVID ORDER STATUS COVID19: NORMAL
SARS-COV-2 RNA RESP QL NAA+PROBE: DETECTED
SPECIMEN SOURCE: ABNORMAL

## 2022-06-19 NOTE — PROGRESS NOTES
Subjective     Aixa Eastman is a 52 y.o. female who presents with Cough (Sore throat, runny nose x 2 days)    HPI:  Aixa Eastman is a 52 y.o. female who presents today for coronavirus screening.  Patient reports that her  started to have symptoms of COVID-19 virus 6 days ago.  He took multiple at home test over the last week which walking back negative.  He got the results of a PCR test yesterday, however, and it was positive.  This morning, patient woke up with some mild symptoms of sore throat, runny nose, and cough.  She is here today for COVID test.  She has not taken any medications for symptoms.  Denies any fever/chills, chest pain, shortness of breath.  She is fully vaccinated for COVID-19 virus.        Review of Systems   Constitutional: Positive for malaise/fatigue. Negative for chills and fever.   HENT: Positive for congestion and sore throat.    Respiratory: Positive for cough.    Cardiovascular: Negative for chest pain.   Gastrointestinal: Negative for abdominal pain, diarrhea, nausea and vomiting.   Neurological: Positive for headaches. Negative for dizziness.         PMH:  has no past medical history on file.  MEDS:   Current Outpatient Medications:   •  lisinopril (PRINIVIL) 10 MG Tab, Take 10 mg by mouth every day., Disp: , Rfl:   •  sertraline (ZOLOFT) 100 MG Tab, Take 100 mg by mouth every day., Disp: , Rfl:   •  pantoprazole (PROTONIX) 40 MG Tablet Delayed Response, Take 40 mg by mouth every day., Disp: , Rfl:   •  atorvastatin (LIPITOR) 20 MG Tab, Take 20 mg by mouth every day., Disp: , Rfl:   •  traZODone (DESYREL) 100 MG Tab, Take 100 mg by mouth every evening., Disp: , Rfl:   •  ARIPiprazole (ABILIFY) 5 MG tablet, Take 5 mg by mouth every day., Disp: , Rfl:   •  Chlorpheniramine Maleate (ALLERGY PO), Take 1 Tablet by mouth every day., Disp: , Rfl:   •  Cholecalciferol 2000 UNIT Cap, Take 1 Capsule by mouth every day., Disp: , Rfl:   •  docusate sodium 100 MG Cap, Take 100 mg by  "mouth 2 times a day. (Patient not taking: Reported on 6/18/2022), Disp: 60 Capsule, Rfl:   •  magnesium hydroxide (MILK OF MAGNESIA) 400 MG/5ML Suspension, Take 30 mL by mouth 1 time a day as needed. (Patient not taking: Reported on 6/18/2022), Disp: , Rfl:   •  polyethylene glycol/lytes (MIRALAX) 17 g Pack, Take  by mouth 2 times a day as needed. (Patient not taking: Reported on 6/18/2022), Disp: , Rfl:   •  senna-docusate (PERICOLACE OR SENOKOT S) 8.6-50 MG Tab, Take 1 Tablet by mouth. (Patient not taking: Reported on 6/18/2022), Disp: 30 Tablet, Rfl: 0  ALLERGIES:   Allergies   Allergen Reactions   • Compazine Hives and Unspecified     Hives  \"Feeling like wanting to crawl out of her skin\" per pt   • Oxycodone      Nausea only   • Pork Allergy      Yazidi      SURGHX:   Past Surgical History:   Procedure Laterality Date   • ROSALIA BY LAPAROSCOPY N/A 12/10/2021    Procedure: ATTEMPTED LAPAROSCOPIC CONVERTED TO OPEN CHOLECYSTECTOMY;  Surgeon: Ernie Valiente M.D.;  Location: SURGERY [facility];  Service: General     SOCHX:  reports that she has never smoked. She has never used smokeless tobacco. She reports that she does not drink alcohol and does not use drugs.  FH: Family history was reviewed, no pertinent findings to report      Objective     /80   Pulse (!) 106   Temp 36.3 °C (97.4 °F) (Temporal)   Resp 16   Ht 1.854 m (6' 1\")   Wt 125 kg (274 lb 9.6 oz)   SpO2 95%   BMI 36.23 kg/m²      Physical Exam  Constitutional:       Appearance: She is well-developed.   HENT:      Head: Normocephalic and atraumatic.      Right Ear: Tympanic membrane, ear canal and external ear normal.      Left Ear: External ear normal.      Nose: Mucosal edema and congestion present. No rhinorrhea.      Mouth/Throat:      Lips: Pink.      Mouth: Mucous membranes are moist.      Pharynx: Oropharynx is clear.   Eyes:      Conjunctiva/sclera: Conjunctivae normal.      Pupils: Pupils are equal, round, and reactive to " light.   Cardiovascular:      Rate and Rhythm: Normal rate and regular rhythm.      Heart sounds: Normal heart sounds. No murmur heard.  Pulmonary:      Effort: Pulmonary effort is normal.      Breath sounds: Normal breath sounds. No decreased breath sounds, wheezing, rhonchi or rales.   Musculoskeletal:      Cervical back: Normal range of motion.   Lymphadenopathy:      Cervical: No cervical adenopathy.   Skin:     General: Skin is warm and dry.      Capillary Refill: Capillary refill takes less than 2 seconds.   Neurological:      Mental Status: She is alert and oriented to person, place, and time.   Psychiatric:         Behavior: Behavior normal.         Judgment: Judgment normal.       POCT SARS-COV Antigen RADHA (Symptomatic only) - Negative    Assessment & Plan     1. Exposure to COVID-19 virus  - POCT SARS-COV Antigen RADHA (Symptomatic only)  - SARS-CoV-2, PCR (In-House); Future    2. Suspected COVID-19 virus infection  - POCT SARS-COV Antigen RADHA (Symptomatic only)  - SARS-CoV-2, PCR (In-House); Future  - OTC cold/flu medications  - PO fluids  - Rest  - Tylenol or ibuprofen as needed for fever > 100.4 F  *Patient had a nasal swab to test for COVID-19 virus.  Patient was advised to stay home and self isolate/self quarantine while awaiting the results.  Supportive care was reiterated.  Return/ER precautions discussed.             Differential Diagnosis, natural history, and supportive care discussed. Return to the Urgent Care or follow up with your PCP if symptoms fail to resolve, or for any new or worsening symptoms. Emergency room precautions discussed. Patient and/or family appears understanding of information.

## 2022-09-22 ENCOUNTER — OFFICE VISIT (OUTPATIENT)
Dept: URGENT CARE | Facility: CLINIC | Age: 52
End: 2022-09-22
Payer: MEDICAID

## 2022-09-22 VITALS
SYSTOLIC BLOOD PRESSURE: 108 MMHG | OXYGEN SATURATION: 95 % | BODY MASS INDEX: 37.82 KG/M2 | HEART RATE: 81 BPM | RESPIRATION RATE: 18 BRPM | DIASTOLIC BLOOD PRESSURE: 74 MMHG | WEIGHT: 279.2 LBS | HEIGHT: 72 IN | TEMPERATURE: 97.4 F

## 2022-09-22 DIAGNOSIS — B34.9 VIRAL ILLNESS: ICD-10-CM

## 2022-09-22 LAB
EXTERNAL QUALITY CONTROL: NORMAL
INT CON NEG: NORMAL
INT CON NEG: NORMAL
INT CON POS: NORMAL
INT CON POS: NORMAL
S PYO AG THROAT QL: NEGATIVE
SARS-COV+SARS-COV-2 AG RESP QL IA.RAPID: NEGATIVE

## 2022-09-22 PROCEDURE — 99213 OFFICE O/P EST LOW 20 MIN: CPT | Performed by: FAMILY MEDICINE

## 2022-09-22 PROCEDURE — 87426 SARSCOV CORONAVIRUS AG IA: CPT | Performed by: FAMILY MEDICINE

## 2022-09-22 PROCEDURE — 87880 STREP A ASSAY W/OPTIC: CPT | Performed by: FAMILY MEDICINE

## 2022-09-22 RX ORDER — CHOLECALCIFEROL (VITAMIN D3) 125 MCG
5 CAPSULE ORAL DAILY
COMMUNITY

## 2022-09-22 ASSESSMENT — ENCOUNTER SYMPTOMS: SORE THROAT: 1

## 2022-09-22 ASSESSMENT — FIBROSIS 4 INDEX: FIB4 SCORE: 0.41

## 2022-09-22 NOTE — PROGRESS NOTES
Subjective     Aixa Eastman is a 52 y.o. female who presents with Coronavirus Screening (Pt has a runny nose, sinus pressure, sore throat x 2 days )      - This is a pleasant and nontoxic appearing 52 y.o. female who has come to the walk-in clinic today for:    #1) 2 days sore throat and stuffy/runny nose. No associated NVFC and feeling well otherwise.       ALLERGIES:  Compazine, Oxycodone, Pork allergy, and Prochlorperazine     PMH:  History reviewed. No pertinent past medical history.     PSH:  Past Surgical History:   Procedure Laterality Date    ROSALIA BY LAPAROSCOPY N/A 12/10/2021    Procedure: ATTEMPTED LAPAROSCOPIC CONVERTED TO OPEN CHOLECYSTECTOMY;  Surgeon: Ernie Valiente M.D.;  Location: SURGERY [facility];  Service: General       MEDS:    Current Outpatient Medications:     Cyanocobalamin (VITAMIN B 12 PO), Take  by mouth., Disp: , Rfl:     melatonin 5 mg Tab, Take 5 mg by mouth every day., Disp: , Rfl:     lisinopril (PRINIVIL) 10 MG Tab, Take 10 mg by mouth every day., Disp: , Rfl:     sertraline (ZOLOFT) 100 MG Tab, Take 100 mg by mouth every day., Disp: , Rfl:     pantoprazole (PROTONIX) 40 MG Tablet Delayed Response, Take 40 mg by mouth every day., Disp: , Rfl:     atorvastatin (LIPITOR) 20 MG Tab, Take 20 mg by mouth every day., Disp: , Rfl:     traZODone (DESYREL) 100 MG Tab, Take 100 mg by mouth every evening., Disp: , Rfl:     ARIPiprazole (ABILIFY) 5 MG tablet, Take 5 mg by mouth every day., Disp: , Rfl:     Chlorpheniramine Maleate (ALLERGY PO), Take 1 Tablet by mouth every day., Disp: , Rfl:     Cholecalciferol 2000 UNIT Cap, Take 1 Capsule by mouth every day., Disp: , Rfl:     ** I have documented what I find to be significant in regards to past medical, social, family and surgical history  in my HPI or under PMH/PSH/FH review section, otherwise it is noncontributory **           HPI    Review of Systems   HENT:  Positive for congestion and sore throat.    All other systems reviewed  "and are negative.           Objective     /74 (BP Location: Left arm, Patient Position: Sitting, BP Cuff Size: Large adult)   Pulse 81   Temp 36.3 °C (97.4 °F) (Temporal)   Resp 18   Ht 1.854 m (6' 1\")   Wt (!) 127 kg (279 lb 3.2 oz)   SpO2 95%   BMI 36.84 kg/m²      Physical Exam  Vitals and nursing note reviewed.   Constitutional:       General: She is not in acute distress.     Appearance: Normal appearance. She is well-developed.   HENT:      Head: Normocephalic.      Mouth/Throat:      Mouth: Mucous membranes are moist.      Pharynx: Oropharynx is clear.   Cardiovascular:      Heart sounds: Normal heart sounds. No murmur heard.  Pulmonary:      Effort: Pulmonary effort is normal. No respiratory distress.      Breath sounds: Normal breath sounds.   Neurological:      Mental Status: She is alert.      Motor: No abnormal muscle tone.   Psychiatric:         Mood and Affect: Mood normal.         Behavior: Behavior normal.         Assessment & Plan       1. Viral illness  POCT SARS-COV Antigen RADHA (Symptomatic only)    POCT Rapid Strep A          - Dx, plan & d/c instructions discussed   - Rest, stay hydrated, OTC Motrin and/or Tylenol as needed  - E.R. precautions discussed     Follow up with your regular primary care providers office for a recheck on today's visit. ER if not improving in 2-3 days or if feeling/getting worse.     Any realistic side effects of medications that may have been given today reviewed.     Patient left in stable condition     POCT results reviewed/discussed         "

## 2022-09-23 ENCOUNTER — APPOINTMENT (OUTPATIENT)
Dept: RADIOLOGY | Facility: MEDICAL CENTER | Age: 52
End: 2022-09-23
Attending: EMERGENCY MEDICINE
Payer: MEDICAID

## 2022-09-23 ENCOUNTER — HOSPITAL ENCOUNTER (EMERGENCY)
Facility: MEDICAL CENTER | Age: 52
End: 2022-09-23
Attending: EMERGENCY MEDICINE
Payer: MEDICAID

## 2022-09-23 VITALS
DIASTOLIC BLOOD PRESSURE: 94 MMHG | SYSTOLIC BLOOD PRESSURE: 141 MMHG | WEIGHT: 284.17 LBS | HEIGHT: 72 IN | TEMPERATURE: 96.6 F | OXYGEN SATURATION: 95 % | HEART RATE: 80 BPM | BODY MASS INDEX: 38.49 KG/M2 | RESPIRATION RATE: 16 BRPM

## 2022-09-23 DIAGNOSIS — S83.411A SPRAIN OF MEDIAL COLLATERAL LIGAMENT OF RIGHT KNEE, INITIAL ENCOUNTER: ICD-10-CM

## 2022-09-23 PROCEDURE — 73564 X-RAY EXAM KNEE 4 OR MORE: CPT | Mod: RT

## 2022-09-23 PROCEDURE — 99283 EMERGENCY DEPT VISIT LOW MDM: CPT

## 2022-09-23 PROCEDURE — A9270 NON-COVERED ITEM OR SERVICE: HCPCS | Performed by: EMERGENCY MEDICINE

## 2022-09-23 PROCEDURE — 700102 HCHG RX REV CODE 250 W/ 637 OVERRIDE(OP): Performed by: EMERGENCY MEDICINE

## 2022-09-23 RX ORDER — ACETAMINOPHEN 500 MG
500 TABLET ORAL EVERY 4 HOURS PRN
Qty: 20 TABLET | Refills: 0 | Status: SHIPPED | OUTPATIENT
Start: 2022-09-23

## 2022-09-23 RX ORDER — ACETAMINOPHEN 500 MG
1000 TABLET ORAL ONCE
Status: COMPLETED | OUTPATIENT
Start: 2022-09-23 | End: 2022-09-23

## 2022-09-23 RX ADMIN — ACETAMINOPHEN 1000 MG: 500 TABLET ORAL at 17:05

## 2022-09-23 ASSESSMENT — FIBROSIS 4 INDEX: FIB4 SCORE: 0.41

## 2022-09-23 NOTE — ED TRIAGE NOTES
"Chief Complaint   Patient presents with    Knee Pain     Saturday pt states her R knee buckled and the pain has progressively worsened since. Pt denies trauma. No obvious deformities, CMS intact.       Ambulatory  to triage for above complaint.   Educated on triage process, encourage to inform staff of any changes.     BP (!) 166/99   Pulse 87   Temp 36 °C (96.8 °F) (Temporal)   Resp 16   Ht 1.854 m (6' 1\")   Wt (!) 129 kg (284 lb 2.8 oz)   SpO2 94%   BMI 37.49 kg/m²     "

## 2022-09-23 NOTE — ED PROVIDER NOTES
"ED Provider Note    ED Provider Note    Scribed for Luis F Stanley MD by Luis F Stanley M.D.. 9/23/2022, 4:58 PM.    Primary care provider: JOVAN Trotter  Means of arrival: Private  History obtained from: Patient  History limited by: None    CHIEF COMPLAINT  Chief Complaint   Patient presents with    Knee Pain     Saturday pt states her R knee buckled and the pain has progressively worsened since. Pt denies trauma. No obvious deformities, CMS intact.       HPI  Aixa Eastman is a 52 y.o. female who presents to the Emergency Department for evaluation of acute knee pain.  Patient notes no particular significant trauma, she relates on Saturday that her knee \"buckled\", she does not describe anything like a patellar subluxation.  No twisting type mechanism either.  She notes pain has been getting gradually worse, she does walk a lot and is a student.  Pain is localized to the medial aspect of the right knee.  No involvement left side.  No numbness, no weakness.  She is able to stand and ambulate but exacerbates the pain.    REVIEW OF SYSTEMS  Pertinent positives include pain without significant trauma or twisting to the medial aspect of the right knee. Pertinent negatives include no fever, no numbness, no weakness, no anticoagulated, no contralateral pain or injury.      PAST MEDICAL HISTORY  Otherwise healthy    SURGICAL HISTORY   has a past surgical history that includes khanh by laparoscopy (N/A, 12/10/2021).    SOCIAL HISTORY  Social History     Tobacco Use    Smoking status: Never    Smokeless tobacco: Never   Vaping Use    Vaping Use: Never used   Substance Use Topics    Alcohol use: Never    Drug use: Never      Social History     Substance and Sexual Activity   Drug Use Never       FAMILY HISTORY  Noncontributory    CURRENT MEDICATIONS  Home Medications       Reviewed by Diana Ken R.N. (Registered Nurse) on 09/23/22 at 1632  Med List Status: Not Addressed     Medication Last " "Dose Status   ARIPiprazole (ABILIFY) 5 MG tablet  Active   atorvastatin (LIPITOR) 20 MG Tab  Active   Chlorpheniramine Maleate (ALLERGY PO)  Active   Cholecalciferol 2000 UNIT Cap  Active   Cyanocobalamin (VITAMIN B 12 PO)  Active   lisinopril (PRINIVIL) 10 MG Tab  Active   melatonin 5 mg Tab  Active   pantoprazole (PROTONIX) 40 MG Tablet Delayed Response  Active   sertraline (ZOLOFT) 100 MG Tab  Active   traZODone (DESYREL) 100 MG Tab  Active                    ALLERGIES  Allergies   Allergen Reactions    Compazine Hives and Unspecified     Hives  \"Feeling like wanting to crawl out of her skin\" per pt    Oxycodone      Nausea only    Pork Allergy      Zoroastrianism     Prochlorperazine Unspecified     Other reaction(s): Other (See Comments)  \"felt like I was crawling out of my skin\"  Feels like skin is on fire, makes her want to rip skin off. No anyphylaxis         PHYSICAL EXAM  VITAL SIGNS: BP (!) 141/94   Pulse 80   Temp 35.9 °C (96.6 °F)   Resp 16   Ht 1.854 m (6' 1\")   Wt (!) 129 kg (284 lb 2.8 oz)   SpO2 95%   BMI 37.49 kg/m²     General: Alert, no acute distress  Skin: Warm, dry, normal for ethnicity  Head: Normocephalic, atraumatic  Neck: Trachea midline  Eye: Sclera are anicteric  Cardiovascular: Normal peripheral perfusion  Respiratory: Respirations are non-labored  Musculoskeletal: No swelling, no deformity.  Negative Lachman sign, negative anterior and posterior drawer sign.  No palpable knee effusion.  Tenderness and pain localized to the medial compartment of the knee.  No laxity with valgus or varus stress, no erythema nor warmth.  Distal function is intact.  Neurological: Alert and oriented to person, place, time, and situation.  Neurovascularly intact with regard to the right lower extremity.  Lymphatics: No right lower extremity lymphangitis  Psychiatric: Cooperative, appropriate mood & affect        RADIOLOGY  DX-KNEE COMPLETE 4+ RIGHT   Final Result      1.  No radiographic evidence of acute " "traumatic injury.   2.  Mild degenerative changes of the medial and patellofemoral compartments.        The radiologist's interpretation of all radiological studies have been reviewed by me.    COURSE & MEDICAL DECISION MAKING  Pertinent Labs & Imaging studies reviewed. (See chart for details)    4:58 PM - Patient seen and examined at bedside. Patient will be treated with acetaminophen. Ordered x-ray imaging of the knee to evaluate her symptoms. The differential diagnoses include but are not limited to: Collateral ligament sprain, meniscus injury, avulsion fracture    1758: Patient reassessed, remains in no acute distress.  Have ordered a knee immobilizer for her.    Patient Vitals for the past 24 hrs:   BP Temp Temp src Pulse Resp SpO2 Height Weight   09/23/22 1803 (!) 141/94 35.9 °C (96.6 °F) -- 80 16 95 % -- --   09/23/22 1630 -- -- -- -- -- -- 1.854 m (6' 1\") (!) 129 kg (284 lb 2.8 oz)   09/23/22 1628 (!) 166/99 36 °C (96.8 °F) Temporal 87 16 94 % -- --        Decision Making:  This is a 52 y.o. year old female who presents with pain to the right knee after \"buckling\" sensation 3 days ago.  She does not describe anything like a patellar subluxation, no obvious twisting mechanism no be concerning for a meniscus injury either.  Pain is overlying the medial collateral ligament, he otherwise is stable with no evidence of ligamentous rupture.  X-ray imaging is unremarkable other than evidence of degenerative change.  Suspect medial collateral ligament sprain.  Patient cannot tolerate NSAIDs, as such continue Tylenol, rest, ice, elevation, and will splint with knee immobilizer.    The patient will return for new or worsening symptoms and is stable at the time of discharge.    Patient has had high blood pressure while in the emergency department, felt likely secondary to medical condition. Counseled patient to monitor blood pressure at home and follow up with primary care physician.      DISPOSITION:  Patient will be " discharged home in stable condition.    FOLLOW UP:  JOVAN Trotter  [id number] [facility] [id number] [phone]    Schedule an appointment as soon as possible for a visit       OUTPATIENT MEDICATIONS:  Discharge Medication List as of 9/23/2022  6:04 PM        START taking these medications    Details   acetaminophen (TYLENOL) 500 MG Tab Take 1 Tablet by mouth every four hours as needed for Moderate Pain or Mild Pain., Disp-20 Tablet, R-0, Normal                FINAL IMPRESSION  1. Sprain of medial collateral ligament of right knee, initial encounter          I, Luis F Stanley M.D. (Scribe), am scribing for, and in the presence of, Luis F Stanley MD.    Electronically signed by: Luis F Stanley M.D. (Scribe), 9/23/2022    ILuis F MD personally performed the services described in this documentation, as scribed by Luis F Stanley M.D. in my presence, and it is both accurate and complete    The note accurately reflects work and decisions made by me.  Luis F Stanley M.D.  9/23/2022  6:40 PM

## 2022-09-24 NOTE — ED NOTES
Pt did not want to use crutches; instead, patient asked tech if a cane would be okay in place of crutches- per MD, a cane would work in place of crutches.

## 2022-09-24 NOTE — ED NOTES
Discharge instructions received. Pt given and explained after visit summary. Knee immobilizer  Applied. Patient educated to return to the ER if symptoms to become worse. Patient verbally acknowledged that she understood the after visit summary and did not have any questions. No line in place. Patient AAOx4 and ambulatory.Patient walked to the exit with tech.   Discharged w/o incident.

## (undated) DEVICE — SET TUBING PNEUMOCLEAR HIGH FLOW SMOKE EVACUATION (10EA/BX)

## (undated) DEVICE — GLOVE BIOGEL SZ 7.5 SURGICAL PF LTX - (50PR/BX 4BX/CA)

## (undated) DEVICE — GOWN WARMING STANDARD FLEX - (30/CA)

## (undated) DEVICE — STERI STRIP COMPOUND BENZOIN - TINCTURE 0.6ML WITH APPLICATOR (40EA/BX)

## (undated) DEVICE — DRAIN FLAT SUCTION 10MMX20CM - LATEX FREE (10EA/CA)

## (undated) DEVICE — SUTURE 4-0 VICRYL PLUS FS-2 - 27 INCH (36/BX)

## (undated) DEVICE — ELECTRODE 850 FOAM ADHESIVE - HYDROGEL RADIOTRNSPRNT (50/PK)

## (undated) DEVICE — TUBING CLEARLINK DUO-VENT - C-FLO (48EA/CA)

## (undated) DEVICE — SODIUM CHL IRRIGATION 0.9% 1000ML (12EA/CA)

## (undated) DEVICE — MASK ANESTHESIA ADULT  - (100/CA)

## (undated) DEVICE — SET LEADWIRE 5 LEAD BEDSIDE DISPOSABLE ECG (1SET OF 5/EA)

## (undated) DEVICE — SUTURE 0 COATED VICRYL 6-18IN - (12PK/BX)

## (undated) DEVICE — KIT ANESTHESIA W/CIRCUIT & 3/LT BAG W/FILTER (20EA/CA)

## (undated) DEVICE — ELECTRODE DUAL RETURN W/ CORD - (50/PK)

## (undated) DEVICE — SUTURE GENERAL

## (undated) DEVICE — BOVIE  BLADE 6 EXTENDED - (50/PK)

## (undated) DEVICE — VESSEL DIVIDER SEAL LAP LIGASURE 37CM (6EA/BX)

## (undated) DEVICE — SET EXTENSION WITH 2 PORTS (48EA/CA) ***PART #2C8610 IS A SUBSTITUTE*****

## (undated) DEVICE — SUCTION INSTRUMENT YANKAUER BULBOUS TIP W/O VENT (50EA/CA)

## (undated) DEVICE — LACTATED RINGERS INJ 1000 ML - (14EA/CA 60CA/PF)

## (undated) DEVICE — SPONGE DRAIN 4 X 4IN 6-PLY - (2/PK25PK/BX12BX/CS)

## (undated) DEVICE — CLIP MED LG INTNL HRZN TI ESCP - (20/BX)

## (undated) DEVICE — NEPTUNE 4 PORT MANIFOLD - (20/PK)

## (undated) DEVICE — HEMOBLAST

## (undated) DEVICE — GLOVE BIOGEL SZ 8 SURGICAL PF LTX - (50PR/BX 4BX/CA)

## (undated) DEVICE — TUBE CONNECT SUCTION CLEAR 120 X 1/4" (50EA/CA)"

## (undated) DEVICE — BAG SPONGE COUNT 10.25 X 32 - BLUE (250/CA)

## (undated) DEVICE — TROCAR Z THREAD 11 X 100 - BLADED (6/BX)

## (undated) DEVICE — SUTURE 2-0 VICRYL PLUS CT-2 - 27 INCH (36/BX)

## (undated) DEVICE — SLEEVE, VASO, THIGH, MED

## (undated) DEVICE — PACK LAP CHOLE OR - (2EA/CA)

## (undated) DEVICE — CLOSURE SKIN STRIP 1/2 X 4 IN - (STERI STRIP) (50/BX 4BX/CA)

## (undated) DEVICE — SYRINGE DISP. 60 CC LL - (30/BX, 12BX/CA)**WHEN THESE ARE GONE ORDER #500206**

## (undated) DEVICE — TOWEL STOP TIMEOUT SAFETY FLAG (40EA/CA)

## (undated) DEVICE — PAD LAP STERILE 18 X 18 - (5/PK 40PK/CA)

## (undated) DEVICE — BAG RETRIEVAL 10ML (10EA/BX)

## (undated) DEVICE — SPONGE GAUZESTER. 2X2 4-PL - (2/PK 50PK/BX 30BX/CS)

## (undated) DEVICE — TROCAR 5X100 BLADED Z-THREAD - KII (6/BX)

## (undated) DEVICE — SET SUCTION/IRRIGATION WITH DISPOSABLE TIP (6/CA )PART #0250-070-520 IS A SUB

## (undated) DEVICE — HEAD HOLDER JUNIOR/ADULT

## (undated) DEVICE — PROTECTOR ULNA NERVE - (36PR/CA)

## (undated) DEVICE — SENSOR SPO2 NEO LNCS ADHESIVE (20/BX) SEE USER NOTES

## (undated) DEVICE — RESERVOIR SUCTION 100 CC - SILICONE (20EA/CA)

## (undated) DEVICE — GLOVE BIOGEL INDICATOR SZ 8 SURGICAL PF LTX - (50/BX 4BX/CA)

## (undated) DEVICE — CANISTER SUCTION 3000ML MECHANICAL FILTER AUTO SHUTOFF MEDI-VAC NONSTERILE LF DISP  (40EA/CA)

## (undated) DEVICE — SUTURE 3-0 SILK SH C/R 18 IN - (12/BX)

## (undated) DEVICE — CHLORAPREP 26 ML APPLICATOR - ORANGE TINT(25/CA)

## (undated) DEVICE — DRESSING TRANSPARENT FILM TEGADERM 2.375 X 2.75"  (100EA/BX)"

## (undated) DEVICE — SUTURE 1 VICRYL PLUSCT-1 27IN - (36/BX)

## (undated) DEVICE — CANNULA W/SEAL 5X100 Z-THRE - ADED KII (12/BX)

## (undated) DEVICE — DRESSING POST OP BORDER 4IN X 12 IN (25EA/CA)